# Patient Record
Sex: FEMALE | Race: WHITE | NOT HISPANIC OR LATINO | Employment: UNEMPLOYED | URBAN - METROPOLITAN AREA
[De-identification: names, ages, dates, MRNs, and addresses within clinical notes are randomized per-mention and may not be internally consistent; named-entity substitution may affect disease eponyms.]

---

## 2018-08-06 ENCOUNTER — HISTORICAL (OUTPATIENT)
Dept: ADMINISTRATIVE | Facility: HOSPITAL | Age: 33
End: 2018-08-06

## 2018-08-06 LAB
ABS NEUT (OLG): 6.28 X10(3)/MCL (ref 2.1–9.2)
BASOPHILS # BLD AUTO: 0 X10(3)/MCL (ref 0–0.2)
BASOPHILS NFR BLD AUTO: 0 %
CHLAMYDIA TRACHOMATIS CULTURE: NEGATIVE
EOSINOPHIL # BLD AUTO: 0 X10(3)/MCL (ref 0–0.9)
EOSINOPHIL NFR BLD AUTO: 0 %
ERYTHROCYTE [DISTWIDTH] IN BLOOD BY AUTOMATED COUNT: 11.9 % (ref 11.5–17)
GROUP & RH: NORMAL
HBV SURFACE AG SERPL QL IA: NEGATIVE
HCT VFR BLD AUTO: 43 % (ref 37–47)
HGB BLD-MCNC: 14.3 GM/DL (ref 12–16)
HIV 1+2 AB+HIV1 P24 AG SERPL QL IA: NEGATIVE
LYMPHOCYTES # BLD AUTO: 2.6 X10(3)/MCL (ref 0.6–4.6)
LYMPHOCYTES NFR BLD AUTO: 27 %
MCH RBC QN AUTO: 30.8 PG (ref 27–31)
MCHC RBC AUTO-ENTMCNC: 33.3 GM/DL (ref 33–36)
MCV RBC AUTO: 92.5 FL (ref 80–94)
MONOCYTES # BLD AUTO: 0.6 X10(3)/MCL (ref 0.1–1.3)
MONOCYTES NFR BLD AUTO: 6 %
NEISSERIA GONORRHOEAE (GC) CULTURE: NEGATIVE
NEUTROPHILS # BLD AUTO: 6.28 X10(3)/MCL (ref 1.4–7.9)
NEUTROPHILS NFR BLD AUTO: 66 %
PAP RECOMMENDATION EXT: NORMAL
PAP SMEAR: NORMAL
PLATELET # BLD AUTO: 280 X10(3)/MCL (ref 130–400)
PMV BLD AUTO: 9.2 FL (ref 9.4–12.4)
RBC # BLD AUTO: 4.65 X10(6)/MCL (ref 4.2–5.4)
RPR SER QL: NORMAL
WBC # SPEC AUTO: 9.6 X10(3)/MCL (ref 4.5–11.5)

## 2018-12-05 ENCOUNTER — HISTORICAL (OUTPATIENT)
Dept: ADMINISTRATIVE | Facility: HOSPITAL | Age: 33
End: 2018-12-05

## 2018-12-05 LAB
ERYTHROCYTE [DISTWIDTH] IN BLOOD BY AUTOMATED COUNT: 12.4 % (ref 11.5–17)
GLUCOSE 1H P 100 G GLC PO SERPL-MCNC: 166 MG/DL (ref 100–180)
HCT VFR BLD AUTO: 39.5 % (ref 37–47)
HGB BLD-MCNC: 13 GM/DL (ref 12–16)
MCH RBC QN AUTO: 31.7 PG (ref 27–31)
MCHC RBC AUTO-ENTMCNC: 32.9 GM/DL (ref 33–36)
MCV RBC AUTO: 96.3 FL (ref 80–94)
PLATELET # BLD AUTO: 206 X10(3)/MCL (ref 130–400)
PMV BLD AUTO: 9.5 FL (ref 9.4–12.4)
RBC # BLD AUTO: 4.1 X10(6)/MCL (ref 4.2–5.4)
WBC # SPEC AUTO: 8.7 X10(3)/MCL (ref 4.5–11.5)

## 2018-12-14 ENCOUNTER — HISTORICAL (OUTPATIENT)
Dept: ADMINISTRATIVE | Facility: HOSPITAL | Age: 33
End: 2018-12-14

## 2018-12-14 LAB
GLUCOSE 1H P 100 G GLC PO SERPL-MCNC: 203 MG/DL (ref 100–180)
GLUCOSE 2H P 100 G GLC PO SERPL-MCNC: 159 MG/DL (ref 70–140)
GLUCOSE 3H P 100 G GLC PO SERPL-MCNC: 103 MG/DL (ref 70–115)
GLUCOSE BS SERPL-MCNC: 89 MG/DL (ref 70–115)

## 2019-01-28 ENCOUNTER — HISTORICAL (OUTPATIENT)
Dept: ADMINISTRATIVE | Facility: HOSPITAL | Age: 34
End: 2019-01-28

## 2019-01-28 LAB
APPEARANCE, UA: CLEAR
BACTERIA #/AREA URNS AUTO: ABNORMAL /HPF
BILIRUB UR QL STRIP: NEGATIVE
COLOR UR: YELLOW
GLUCOSE (UA): NEGATIVE
HGB UR QL STRIP: ABNORMAL
KETONES UR QL STRIP: NEGATIVE
LEUKOCYTE ESTERASE UR QL STRIP: ABNORMAL
NITRITE UR QL STRIP.AUTO: NEGATIVE
PH UR STRIP: 6 [PH] (ref 5–9)
PROT UR QL STRIP: NEGATIVE
RBC #/AREA URNS HPF: 5 /HPF (ref 0–2)
SP GR UR STRIP: 1.01 (ref 1–1.03)
SQUAMOUS EPITHELIAL, UA: ABNORMAL
UROBILINOGEN UR STRIP-ACNC: 0.2
WBC #/AREA URNS AUTO: 5 /HPF (ref 0–3)

## 2019-01-29 ENCOUNTER — HISTORICAL (OUTPATIENT)
Dept: RADIOLOGY | Facility: HOSPITAL | Age: 34
End: 2019-01-29

## 2019-01-30 LAB — FINAL CULTURE: NO GROWTH

## 2019-02-07 ENCOUNTER — HISTORICAL (OUTPATIENT)
Dept: LAB | Facility: HOSPITAL | Age: 34
End: 2019-02-07

## 2019-04-22 ENCOUNTER — HISTORICAL (OUTPATIENT)
Dept: ADMINISTRATIVE | Facility: HOSPITAL | Age: 34
End: 2019-04-22

## 2022-04-11 ENCOUNTER — HISTORICAL (OUTPATIENT)
Dept: ADMINISTRATIVE | Facility: HOSPITAL | Age: 37
End: 2022-04-11

## 2022-04-27 VITALS
SYSTOLIC BLOOD PRESSURE: 120 MMHG | BODY MASS INDEX: 42.68 KG/M2 | DIASTOLIC BLOOD PRESSURE: 78 MMHG | HEIGHT: 64 IN | WEIGHT: 250 LBS

## 2022-09-21 ENCOUNTER — LAB VISIT (OUTPATIENT)
Dept: LAB | Facility: HOSPITAL | Age: 37
End: 2022-09-21
Attending: INTERNAL MEDICINE
Payer: COMMERCIAL

## 2022-09-21 DIAGNOSIS — K21.9 GASTROESOPHAGEAL REFLUX DISEASE, UNSPECIFIED WHETHER ESOPHAGITIS PRESENT: ICD-10-CM

## 2022-09-21 DIAGNOSIS — K76.0 FATTY METAMORPHOSIS OF LIVER: ICD-10-CM

## 2022-09-21 DIAGNOSIS — K82.8 ADHESION OF GALLBLADDER: ICD-10-CM

## 2022-09-21 DIAGNOSIS — R10.13 ABDOMINAL PAIN, EPIGASTRIC: Primary | ICD-10-CM

## 2022-09-21 DIAGNOSIS — F41.1 GENERALIZED ANXIETY DISORDER: ICD-10-CM

## 2022-09-21 DIAGNOSIS — E28.2 POLYCYSTIC OVARIES: ICD-10-CM

## 2022-09-21 DIAGNOSIS — E66.9 OBESITY, UNSPECIFIED CLASSIFICATION, UNSPECIFIED OBESITY TYPE, UNSPECIFIED WHETHER SERIOUS COMORBIDITY PRESENT: ICD-10-CM

## 2022-09-21 DIAGNOSIS — N20.0 URIC ACID NEPHROLITHIASIS: ICD-10-CM

## 2022-09-21 DIAGNOSIS — R19.4 FREQUENT BOWEL MOVEMENTS: ICD-10-CM

## 2022-09-21 LAB
ALBUMIN SERPL-MCNC: 4.3 GM/DL (ref 3.5–5)
ALBUMIN/GLOB SERPL: 1.5 RATIO (ref 1.1–2)
ALP SERPL-CCNC: 79 UNIT/L (ref 40–150)
ALT SERPL-CCNC: 35 UNIT/L (ref 0–55)
AMYLASE SERPL-CCNC: 47 UNIT/L (ref 25–125)
AST SERPL-CCNC: 19 UNIT/L (ref 5–34)
BASOPHILS # BLD AUTO: 0.02 X10(3)/MCL (ref 0–0.2)
BASOPHILS NFR BLD AUTO: 0.2 %
BILIRUBIN DIRECT+TOT PNL SERPL-MCNC: 0.7 MG/DL
BUN SERPL-MCNC: 8.3 MG/DL (ref 7–18.7)
CALCIUM SERPL-MCNC: 9.6 MG/DL (ref 8.4–10.2)
CHLORIDE SERPL-SCNC: 104 MMOL/L (ref 98–107)
CO2 SERPL-SCNC: 27 MMOL/L (ref 22–29)
CREAT SERPL-MCNC: 0.72 MG/DL (ref 0.55–1.02)
EOSINOPHIL # BLD AUTO: 0.34 X10(3)/MCL (ref 0–0.9)
EOSINOPHIL NFR BLD AUTO: 3.7 %
ERYTHROCYTE [DISTWIDTH] IN BLOOD BY AUTOMATED COUNT: 12.5 % (ref 11.5–17)
GFR SERPLBLD CREATININE-BSD FMLA CKD-EPI: >60 MLS/MIN/1.73/M2
GLOBULIN SER-MCNC: 2.9 GM/DL (ref 2.4–3.5)
GLUCOSE SERPL-MCNC: 82 MG/DL (ref 74–100)
HCT VFR BLD AUTO: 38.1 % (ref 37–47)
HGB BLD-MCNC: 12.4 GM/DL (ref 12–16)
IGA SERPL-MCNC: 114 MG/DL (ref 65–421)
IMM GRANULOCYTES # BLD AUTO: 0.05 X10(3)/MCL (ref 0–0.04)
IMM GRANULOCYTES NFR BLD AUTO: 0.5 %
LIPASE SERPL-CCNC: 13 U/L
LYMPHOCYTES # BLD AUTO: 2.99 X10(3)/MCL (ref 0.6–4.6)
LYMPHOCYTES NFR BLD AUTO: 32.8 %
MCH RBC QN AUTO: 29.4 PG (ref 27–31)
MCHC RBC AUTO-ENTMCNC: 32.5 MG/DL (ref 33–36)
MCV RBC AUTO: 90.3 FL (ref 80–94)
MONOCYTES # BLD AUTO: 0.61 X10(3)/MCL (ref 0.1–1.3)
MONOCYTES NFR BLD AUTO: 6.7 %
NEUTROPHILS # BLD AUTO: 5.1 X10(3)/MCL (ref 2.1–9.2)
NEUTROPHILS NFR BLD AUTO: 56.1 %
NRBC BLD AUTO-RTO: 0 %
PLATELET # BLD AUTO: 223 X10(3)/MCL (ref 130–400)
PMV BLD AUTO: 10.1 FL (ref 7.4–10.4)
POTASSIUM SERPL-SCNC: 3.7 MMOL/L (ref 3.5–5.1)
PROT SERPL-MCNC: 7.2 GM/DL (ref 6.4–8.3)
RBC # BLD AUTO: 4.22 X10(6)/MCL (ref 4.2–5.4)
SODIUM SERPL-SCNC: 139 MMOL/L (ref 136–145)
TSH SERPL-ACNC: 1.74 UIU/ML (ref 0.35–4.94)
WBC # SPEC AUTO: 9.1 X10(3)/MCL (ref 4.5–11.5)

## 2022-09-21 PROCEDURE — 36415 COLL VENOUS BLD VENIPUNCTURE: CPT

## 2022-09-21 PROCEDURE — 82150 ASSAY OF AMYLASE: CPT

## 2022-09-21 PROCEDURE — 85025 COMPLETE CBC W/AUTO DIFF WBC: CPT

## 2022-09-21 PROCEDURE — 80053 COMPREHEN METABOLIC PANEL: CPT

## 2022-09-21 PROCEDURE — 84443 ASSAY THYROID STIM HORMONE: CPT

## 2022-09-21 PROCEDURE — 83690 ASSAY OF LIPASE: CPT

## 2022-09-21 PROCEDURE — 82784 ASSAY IGA/IGD/IGG/IGM EACH: CPT

## 2022-09-21 PROCEDURE — 83516 IMMUNOASSAY NONANTIBODY: CPT

## 2022-09-26 LAB — TISSUE TRANSGLUTAMINASE IGA ANTIBODY (OHS) CATEGORY: NEGATIVE

## 2022-10-11 ENCOUNTER — PATIENT MESSAGE (OUTPATIENT)
Dept: ADMINISTRATIVE | Facility: OTHER | Age: 37
End: 2022-10-11
Payer: COMMERCIAL

## 2022-10-12 ENCOUNTER — PATIENT MESSAGE (OUTPATIENT)
Dept: ADMINISTRATIVE | Facility: OTHER | Age: 37
End: 2022-10-12
Payer: COMMERCIAL

## 2022-10-13 ENCOUNTER — PATIENT MESSAGE (OUTPATIENT)
Dept: ADMINISTRATIVE | Facility: OTHER | Age: 37
End: 2022-10-13
Payer: COMMERCIAL

## 2022-10-13 ENCOUNTER — LAB VISIT (OUTPATIENT)
Dept: LAB | Facility: HOSPITAL | Age: 37
End: 2022-10-13
Attending: SURGERY
Payer: COMMERCIAL

## 2022-10-13 DIAGNOSIS — Z01.818 PREOPERATIVE EXAMINATION, UNSPECIFIED: Primary | ICD-10-CM

## 2022-10-13 LAB — SARS-COV-2 RDRP RESP QL NAA+PROBE: NEGATIVE

## 2022-10-13 PROCEDURE — 87635 SARS-COV-2 COVID-19 AMP PRB: CPT

## 2022-10-14 PROBLEM — K81.1 CHRONIC CHOLECYSTITIS: Status: ACTIVE | Noted: 2022-10-14

## 2023-01-23 LAB
LEFT EYE DM RETINOPATHY: NEGATIVE
RIGHT EYE DM RETINOPATHY: NEGATIVE

## 2023-01-25 ENCOUNTER — DOCUMENTATION ONLY (OUTPATIENT)
Dept: ADMINISTRATIVE | Facility: HOSPITAL | Age: 38
End: 2023-01-25
Payer: COMMERCIAL

## 2023-02-09 ENCOUNTER — TELEPHONE (OUTPATIENT)
Dept: FAMILY MEDICINE | Facility: CLINIC | Age: 38
End: 2023-02-09

## 2023-02-09 ENCOUNTER — OFFICE VISIT (OUTPATIENT)
Dept: FAMILY MEDICINE | Facility: CLINIC | Age: 38
End: 2023-02-09
Payer: COMMERCIAL

## 2023-02-09 VITALS
WEIGHT: 181 LBS | TEMPERATURE: 97 F | HEART RATE: 104 BPM | SYSTOLIC BLOOD PRESSURE: 118 MMHG | DIASTOLIC BLOOD PRESSURE: 68 MMHG | BODY MASS INDEX: 30.9 KG/M2 | RESPIRATION RATE: 18 BRPM | HEIGHT: 64 IN | OXYGEN SATURATION: 97 %

## 2023-02-09 DIAGNOSIS — E11.9 TYPE 2 DIABETES MELLITUS WITHOUT COMPLICATION, WITHOUT LONG-TERM CURRENT USE OF INSULIN: Primary | ICD-10-CM

## 2023-02-09 DIAGNOSIS — N13.30 HYDRONEPHROSIS, UNSPECIFIED HYDRONEPHROSIS TYPE: Primary | ICD-10-CM

## 2023-02-09 DIAGNOSIS — F41.9 ANXIETY: ICD-10-CM

## 2023-02-09 DIAGNOSIS — E78.2 MIXED HYPERLIPIDEMIA: ICD-10-CM

## 2023-02-09 DIAGNOSIS — J01.00 ACUTE NON-RECURRENT MAXILLARY SINUSITIS: ICD-10-CM

## 2023-02-09 DIAGNOSIS — F34.1 PERSISTENT DEPRESSIVE DISORDER: ICD-10-CM

## 2023-02-09 PROBLEM — E66.01 SEVERE OBESITY (BMI 35.0-39.9) WITH COMORBIDITY: Status: ACTIVE | Noted: 2023-02-09

## 2023-02-09 PROCEDURE — 3008F BODY MASS INDEX DOCD: CPT | Mod: CPTII,,, | Performed by: NURSE PRACTITIONER

## 2023-02-09 PROCEDURE — 1159F PR MEDICATION LIST DOCUMENTED IN MEDICAL RECORD: ICD-10-PCS | Mod: CPTII,,, | Performed by: NURSE PRACTITIONER

## 2023-02-09 PROCEDURE — 99213 OFFICE O/P EST LOW 20 MIN: CPT | Mod: ,,, | Performed by: NURSE PRACTITIONER

## 2023-02-09 PROCEDURE — 3008F PR BODY MASS INDEX (BMI) DOCUMENTED: ICD-10-PCS | Mod: CPTII,,, | Performed by: NURSE PRACTITIONER

## 2023-02-09 PROCEDURE — 3074F SYST BP LT 130 MM HG: CPT | Mod: CPTII,,, | Performed by: NURSE PRACTITIONER

## 2023-02-09 PROCEDURE — 3078F DIAST BP <80 MM HG: CPT | Mod: CPTII,,, | Performed by: NURSE PRACTITIONER

## 2023-02-09 PROCEDURE — 1159F MED LIST DOCD IN RCRD: CPT | Mod: CPTII,,, | Performed by: NURSE PRACTITIONER

## 2023-02-09 PROCEDURE — 3078F PR MOST RECENT DIASTOLIC BLOOD PRESSURE < 80 MM HG: ICD-10-PCS | Mod: CPTII,,, | Performed by: NURSE PRACTITIONER

## 2023-02-09 PROCEDURE — 99213 PR OFFICE/OUTPT VISIT, EST, LEVL III, 20-29 MIN: ICD-10-PCS | Mod: ,,, | Performed by: NURSE PRACTITIONER

## 2023-02-09 PROCEDURE — 3074F PR MOST RECENT SYSTOLIC BLOOD PRESSURE < 130 MM HG: ICD-10-PCS | Mod: CPTII,,, | Performed by: NURSE PRACTITIONER

## 2023-02-09 RX ORDER — AMOXICILLIN 875 MG/1
875 TABLET, FILM COATED ORAL EVERY 12 HOURS
Qty: 20 TABLET | Refills: 0 | Status: SHIPPED | OUTPATIENT
Start: 2023-02-09 | End: 2023-02-19

## 2023-02-09 NOTE — PROGRESS NOTES
SUBJECTIVE:  Nuris Montgomery is a 37 y.o. female here for Nasal Congestion, Fever (High temp 100), Sore Throat, Otalgia (Bilateral intermittent), Headache, and Medication Reaction (Abilify causing irritability)      Fever   Associated symptoms include ear pain, headaches and a sore throat. Pertinent negatives include no chest pain, diarrhea, urinary pain, vomiting or wheezing.   Sore Throat   Associated symptoms include ear pain, headaches, neck pain and trouble swallowing. Pertinent negatives include no diarrhea or vomiting.   Otalgia   Associated symptoms include headaches, neck pain, rhinorrhea and a sore throat. Pertinent negatives include no diarrhea, hearing loss or vomiting.   Headache   Associated symptoms include ear pain, a fever, neck pain, rhinorrhea and a sore throat. Pertinent negatives include no hearing loss, vomiting or weakness.   Medication Reaction  Associated symptoms include a fever, headaches, neck pain and a sore throat. Pertinent negatives include no arthralgias, chest pain, joint swelling, vomiting or weakness.   Presents to the clinic with c/o sinus congestion, sore that and low grade fever for over a week.  Denies n/v/d. Tiny   Nuris's allergies, medications, history, and problem list were updated as appropriate.    Review of Systems   Constitutional:  Positive for fever. Negative for activity change and unexpected weight change.   HENT:  Positive for ear pain, rhinorrhea, sore throat and trouble swallowing. Negative for hearing loss.    Eyes:  Negative for discharge and visual disturbance.   Respiratory:  Negative for chest tightness and wheezing.    Cardiovascular:  Negative for chest pain and palpitations.   Gastrointestinal:  Negative for blood in stool, constipation, diarrhea and vomiting.   Endocrine: Negative for polydipsia and polyuria.   Genitourinary:  Negative for difficulty urinating, dysuria, hematuria and menstrual problem.   Musculoskeletal:  Positive for neck pain. Negative  "for arthralgias and joint swelling.   Neurological:  Positive for headaches. Negative for weakness.   Psychiatric/Behavioral:  Negative for confusion and dysphoric mood. The patient is nervous/anxious.     A comprehensive review of symptoms was completed and negative except as noted above.    No results found for this or any previous visit (from the past 504 hour(s)).    OBJECTIVE:  Vital signs  Vitals:    02/09/23 1500   BP: 118/68   BP Location: Left arm   Patient Position: Sitting   Pulse: 104   Resp: 18   Temp: 97.4 °F (36.3 °C)   TempSrc: Temporal   SpO2: 97%   Weight: 82.1 kg (181 lb)   Height: 5' 4" (1.626 m)        Physical Exam  Constitutional:       Appearance: Normal appearance.   HENT:      Head: Normocephalic and atraumatic.      Right Ear: Tympanic membrane and external ear normal.      Left Ear: Tympanic membrane and external ear normal.      Nose: Congestion present.      Mouth/Throat:      Mouth: Mucous membranes are moist.      Pharynx: Oropharynx is clear. Posterior oropharyngeal erythema present.   Eyes:      Extraocular Movements: Extraocular movements intact.      Conjunctiva/sclera: Conjunctivae normal.      Pupils: Pupils are equal, round, and reactive to light.   Cardiovascular:      Rate and Rhythm: Normal rate and regular rhythm.   Pulmonary:      Effort: Pulmonary effort is normal.      Breath sounds: Normal breath sounds.   Abdominal:      General: Bowel sounds are normal.      Palpations: Abdomen is soft.   Musculoskeletal:         General: Normal range of motion.      Cervical back: Normal range of motion and neck supple.   Skin:     General: Skin is warm.   Neurological:      Mental Status: She is alert.   Psychiatric:         Mood and Affect: Mood is anxious.         Behavior: Behavior is agitated.         Judgment: Judgment normal.        ASSESSMENT/PLAN:  1. Type 2 diabetes mellitus without complication, without long-term current use of insulin  Comments:  increase Mounjaro to 15 " mg    2. Acute non-recurrent maxillary sinusitis  -     amoxicillin (AMOXIL) 875 MG tablet; Take 1 tablet (875 mg total) by mouth every 12 (twelve) hours. for 10 days  Dispense: 20 tablet; Refill: 0    3. Anxiety  Comments:  stopped Abilify because she said it made her feel more anxious.    4. Persistent depressive disorder  Comments:  continue Lexapro    5. Mixed hyperlipidemia  Comments:  continue rosuvastatin        Follow Up:  No follow-ups on file.

## 2023-03-16 ENCOUNTER — OFFICE VISIT (OUTPATIENT)
Dept: FAMILY MEDICINE | Facility: CLINIC | Age: 38
End: 2023-03-16
Payer: COMMERCIAL

## 2023-03-16 VITALS
WEIGHT: 170 LBS | BODY MASS INDEX: 29.02 KG/M2 | OXYGEN SATURATION: 100 % | HEIGHT: 64 IN | SYSTOLIC BLOOD PRESSURE: 128 MMHG | DIASTOLIC BLOOD PRESSURE: 70 MMHG | TEMPERATURE: 99 F | RESPIRATION RATE: 20 BRPM | HEART RATE: 107 BPM

## 2023-03-16 DIAGNOSIS — F41.9 ANXIETY AND DEPRESSION: ICD-10-CM

## 2023-03-16 DIAGNOSIS — E11.9 TYPE 2 DIABETES MELLITUS WITHOUT COMPLICATION, WITHOUT LONG-TERM CURRENT USE OF INSULIN: Primary | ICD-10-CM

## 2023-03-16 DIAGNOSIS — F41.1 GAD (GENERALIZED ANXIETY DISORDER): ICD-10-CM

## 2023-03-16 DIAGNOSIS — F32.A ANXIETY AND DEPRESSION: ICD-10-CM

## 2023-03-16 PROCEDURE — 3078F PR MOST RECENT DIASTOLIC BLOOD PRESSURE < 80 MM HG: ICD-10-PCS | Mod: CPTII,,, | Performed by: NURSE PRACTITIONER

## 2023-03-16 PROCEDURE — 1159F MED LIST DOCD IN RCRD: CPT | Mod: CPTII,,, | Performed by: NURSE PRACTITIONER

## 2023-03-16 PROCEDURE — 3074F PR MOST RECENT SYSTOLIC BLOOD PRESSURE < 130 MM HG: ICD-10-PCS | Mod: CPTII,,, | Performed by: NURSE PRACTITIONER

## 2023-03-16 PROCEDURE — 99213 PR OFFICE/OUTPT VISIT, EST, LEVL III, 20-29 MIN: ICD-10-PCS | Mod: ,,, | Performed by: NURSE PRACTITIONER

## 2023-03-16 PROCEDURE — 99213 OFFICE O/P EST LOW 20 MIN: CPT | Mod: ,,, | Performed by: NURSE PRACTITIONER

## 2023-03-16 PROCEDURE — 3008F BODY MASS INDEX DOCD: CPT | Mod: CPTII,,, | Performed by: NURSE PRACTITIONER

## 2023-03-16 PROCEDURE — 1159F PR MEDICATION LIST DOCUMENTED IN MEDICAL RECORD: ICD-10-PCS | Mod: CPTII,,, | Performed by: NURSE PRACTITIONER

## 2023-03-16 PROCEDURE — 3008F PR BODY MASS INDEX (BMI) DOCUMENTED: ICD-10-PCS | Mod: CPTII,,, | Performed by: NURSE PRACTITIONER

## 2023-03-16 PROCEDURE — 3078F DIAST BP <80 MM HG: CPT | Mod: CPTII,,, | Performed by: NURSE PRACTITIONER

## 2023-03-16 PROCEDURE — 3074F SYST BP LT 130 MM HG: CPT | Mod: CPTII,,, | Performed by: NURSE PRACTITIONER

## 2023-03-16 RX ORDER — ALPRAZOLAM 0.25 MG/1
0.25 TABLET ORAL 3 TIMES DAILY PRN
Qty: 75 TABLET | Refills: 1 | Status: SHIPPED | OUTPATIENT
Start: 2023-03-16 | End: 2024-03-13

## 2023-03-16 RX ORDER — TIRZEPATIDE 15 MG/.5ML
15 INJECTION, SOLUTION SUBCUTANEOUS
Qty: 4 PEN | Refills: 2 | Status: SHIPPED | OUTPATIENT
Start: 2023-03-16 | End: 2023-04-15

## 2023-03-16 RX ORDER — TIRZEPATIDE 15 MG/.5ML
INJECTION, SOLUTION SUBCUTANEOUS
COMMUNITY
Start: 2023-02-09 | End: 2023-03-16 | Stop reason: SDUPTHER

## 2023-03-16 NOTE — PROGRESS NOTES
"SUBJECTIVE:  Nuris Montgomery is a 37 y.o. female here for Follow-up (1 mth f/u on medications. Working well. Needing refills. )      HPI  Presents to the clinic in follow-up.  Farooq continues to work well.  She is very anxious as she was called at 2 am being told her  broke is back.  He works [employer] and fell in an 8 ft hole.  He is awaiting transfer to a larger facility.  She is scheduled to have a hysterectomy in the next month.      Nuris's allergies, medications, history, and problem list were updated as appropriate.    Review of Systems   Constitutional:  Negative for activity change and unexpected weight change.   HENT:  Negative for hearing loss, rhinorrhea and trouble swallowing.    Eyes:  Negative for discharge and visual disturbance.   Respiratory:  Negative for chest tightness and wheezing.    Cardiovascular:  Negative for chest pain and palpitations.   Gastrointestinal:  Negative for blood in stool, constipation, diarrhea and vomiting.   Endocrine: Negative for polydipsia and polyuria.   Genitourinary:  Negative for difficulty urinating, dysuria, hematuria and menstrual problem.   Musculoskeletal:  Negative for arthralgias, joint swelling and neck pain.   Neurological:  Negative for weakness and headaches.   Psychiatric/Behavioral:  Negative for confusion and dysphoric mood.     A comprehensive review of symptoms was completed and negative except as noted above.    No results found for this or any previous visit (from the past 504 hour(s)).    OBJECTIVE:  Vital signs  Vitals:    03/16/23 0940   BP: 128/70   BP Location: Right arm   Patient Position: Sitting   Pulse: 107   Resp: 20   Temp: 98.6 °F (37 °C)   SpO2: 100%   Weight: 77.1 kg (170 lb)   Height: 5' 4" (1.626 m)        Physical Exam  Constitutional:       Appearance: Normal appearance.   HENT:      Head: Normocephalic and atraumatic.      Nose: Nose normal.      Mouth/Throat:      Mouth: Mucous membranes are moist.      Pharynx: " Oropharynx is clear.   Eyes:      Extraocular Movements: Extraocular movements intact.      Conjunctiva/sclera: Conjunctivae normal.      Pupils: Pupils are equal, round, and reactive to light.   Cardiovascular:      Rate and Rhythm: Normal rate and regular rhythm.   Pulmonary:      Effort: Pulmonary effort is normal.      Breath sounds: Normal breath sounds.   Abdominal:      General: Bowel sounds are normal.      Palpations: Abdomen is soft.   Musculoskeletal:         General: Normal range of motion.      Cervical back: Normal range of motion and neck supple.   Skin:     General: Skin is warm.   Neurological:      Mental Status: She is alert.   Psychiatric:         Mood and Affect: Mood is anxious. Affect is tearful.         Behavior: Behavior normal.         Judgment: Judgment normal.        ASSESSMENT/PLAN:  1. Type 2 diabetes mellitus without complication, without long-term current use of insulin  -     MOUNJARO 15 mg/0.5 mL PnIj; Inject 15 mg into the skin every 7 days.  Dispense: 4 pen; Refill: 2    2. ZOEY (generalized anxiety disorder)  -     ALPRAZolam (XANAX) 0.25 MG tablet; Take 1 tablet (0.25 mg total) by mouth 3 (three) times daily as needed for Anxiety. as needed for anxiety.  Dispense: 75 tablet; Refill: 1    3. Anxiety and depression  -     EScitalopram oxalate (LEXAPRO) 20 MG tablet; Take 1 tablet (20 mg total) by mouth once daily.  Dispense: 30 tablet; Refill: 2        Follow Up:  Follow up in about 3 months (around 6/16/2023).

## 2023-03-20 RX ORDER — ESCITALOPRAM OXALATE 20 MG/1
20 TABLET ORAL DAILY
Qty: 30 TABLET | Refills: 2 | Status: SHIPPED | OUTPATIENT
Start: 2023-03-20 | End: 2023-09-19

## 2023-03-30 ENCOUNTER — DOCUMENTATION ONLY (OUTPATIENT)
Dept: ADMINISTRATIVE | Facility: HOSPITAL | Age: 38
End: 2023-03-30
Payer: COMMERCIAL

## 2023-05-10 ENCOUNTER — TELEPHONE (OUTPATIENT)
Dept: FAMILY MEDICINE | Facility: CLINIC | Age: 38
End: 2023-05-10
Payer: COMMERCIAL

## 2023-05-10 DIAGNOSIS — E11.9 TYPE 2 DIABETES MELLITUS WITHOUT COMPLICATION, WITHOUT LONG-TERM CURRENT USE OF INSULIN: ICD-10-CM

## 2023-05-10 RX ORDER — METFORMIN HYDROCHLORIDE 500 MG/1
TABLET, EXTENDED RELEASE ORAL
Qty: 60 TABLET | Refills: 2 | Status: SHIPPED | OUTPATIENT
Start: 2023-05-10 | End: 2023-10-18 | Stop reason: SDUPTHER

## 2023-05-10 NOTE — TELEPHONE ENCOUNTER
----- Message from Magda Norman sent at 5/10/2023  9:06 AM CDT -----  Patient needs refill on Metformin 500 ER called in to [facility] in [place]

## 2023-05-16 ENCOUNTER — TELEPHONE (OUTPATIENT)
Dept: FAMILY MEDICINE | Facility: CLINIC | Age: 38
End: 2023-05-16
Payer: COMMERCIAL

## 2023-05-16 DIAGNOSIS — F41.9 ANXIETY AND DEPRESSION: Primary | ICD-10-CM

## 2023-05-16 DIAGNOSIS — F32.A ANXIETY AND DEPRESSION: Primary | ICD-10-CM

## 2023-05-16 RX ORDER — BREXPIPRAZOLE 1 MG/1
1 TABLET ORAL DAILY
Qty: 30 TABLET | Refills: 0 | Status: SHIPPED | OUTPATIENT
Start: 2023-05-16 | End: 2023-06-19

## 2023-05-16 NOTE — TELEPHONE ENCOUNTER
----- Message from Kaylene Villatoro sent at 5/16/2023  9:52 AM CDT -----  Regarding: Send script to pharm      She was put on a 14 day starter pack of Rexulti and was told to call here to let Farideh know how she is doing on the new med.  She loves it and wants to continue.  Please call out a script for Rexulti          [facility] in [place]

## 2023-05-18 DIAGNOSIS — F41.1 GAD (GENERALIZED ANXIETY DISORDER): Primary | ICD-10-CM

## 2023-05-18 RX ORDER — BREXPIPRAZOLE 1 MG/1
1 TABLET ORAL DAILY
Qty: 14 TABLET | Refills: 0 | COMMUNITY
Start: 2023-05-18 | End: 2023-06-19

## 2023-06-19 DIAGNOSIS — F32.A ANXIETY AND DEPRESSION: ICD-10-CM

## 2023-06-19 DIAGNOSIS — F41.9 ANXIETY AND DEPRESSION: ICD-10-CM

## 2023-06-19 RX ORDER — BREXPIPRAZOLE 1 MG/1
1 TABLET ORAL DAILY
Qty: 30 TABLET | Refills: 2 | Status: SHIPPED | OUTPATIENT
Start: 2023-06-19 | End: 2023-07-31

## 2023-07-19 ENCOUNTER — OFFICE VISIT (OUTPATIENT)
Dept: FAMILY MEDICINE | Facility: CLINIC | Age: 38
End: 2023-07-19
Payer: COMMERCIAL

## 2023-07-19 VITALS
SYSTOLIC BLOOD PRESSURE: 124 MMHG | HEART RATE: 90 BPM | DIASTOLIC BLOOD PRESSURE: 70 MMHG | TEMPERATURE: 97 F | WEIGHT: 157 LBS | HEIGHT: 64 IN | BODY MASS INDEX: 26.8 KG/M2 | RESPIRATION RATE: 18 BRPM | OXYGEN SATURATION: 98 %

## 2023-07-19 DIAGNOSIS — E11.9 ENCOUNTER FOR DIABETIC FOOT EXAM: ICD-10-CM

## 2023-07-19 DIAGNOSIS — F32.A ANXIETY AND DEPRESSION: ICD-10-CM

## 2023-07-19 DIAGNOSIS — E11.9 TYPE 2 DIABETES MELLITUS WITHOUT COMPLICATION, WITHOUT LONG-TERM CURRENT USE OF INSULIN: Primary | ICD-10-CM

## 2023-07-19 DIAGNOSIS — F41.9 ANXIETY AND DEPRESSION: ICD-10-CM

## 2023-07-19 PROCEDURE — 3008F PR BODY MASS INDEX (BMI) DOCUMENTED: ICD-10-PCS | Mod: CPTII,,, | Performed by: NURSE PRACTITIONER

## 2023-07-19 PROCEDURE — 1159F PR MEDICATION LIST DOCUMENTED IN MEDICAL RECORD: ICD-10-PCS | Mod: CPTII,,, | Performed by: NURSE PRACTITIONER

## 2023-07-19 PROCEDURE — 3078F DIAST BP <80 MM HG: CPT | Mod: CPTII,,, | Performed by: NURSE PRACTITIONER

## 2023-07-19 PROCEDURE — 99213 OFFICE O/P EST LOW 20 MIN: CPT | Mod: ,,, | Performed by: NURSE PRACTITIONER

## 2023-07-19 PROCEDURE — 3008F BODY MASS INDEX DOCD: CPT | Mod: CPTII,,, | Performed by: NURSE PRACTITIONER

## 2023-07-19 PROCEDURE — 3074F SYST BP LT 130 MM HG: CPT | Mod: CPTII,,, | Performed by: NURSE PRACTITIONER

## 2023-07-19 PROCEDURE — 3074F PR MOST RECENT SYSTOLIC BLOOD PRESSURE < 130 MM HG: ICD-10-PCS | Mod: CPTII,,, | Performed by: NURSE PRACTITIONER

## 2023-07-19 PROCEDURE — 3078F PR MOST RECENT DIASTOLIC BLOOD PRESSURE < 80 MM HG: ICD-10-PCS | Mod: CPTII,,, | Performed by: NURSE PRACTITIONER

## 2023-07-19 PROCEDURE — 99213 PR OFFICE/OUTPT VISIT, EST, LEVL III, 20-29 MIN: ICD-10-PCS | Mod: ,,, | Performed by: NURSE PRACTITIONER

## 2023-07-19 PROCEDURE — 1159F MED LIST DOCD IN RCRD: CPT | Mod: CPTII,,, | Performed by: NURSE PRACTITIONER

## 2023-07-19 RX ORDER — BREXPIPRAZOLE 2 MG/1
2 TABLET ORAL DAILY
Qty: 14 TABLET | Refills: 0 | COMMUNITY
Start: 2023-07-19 | End: 2023-07-31 | Stop reason: SDUPTHER

## 2023-07-19 RX ORDER — TIRZEPATIDE 15 MG/.5ML
INJECTION, SOLUTION SUBCUTANEOUS
Qty: 4 PEN | Refills: 2 | Status: SHIPPED | OUTPATIENT
Start: 2023-07-19 | End: 2023-09-25

## 2023-07-19 RX ORDER — TRANEXAMIC ACID 650 MG/1
650 TABLET ORAL 3 TIMES DAILY
COMMUNITY
Start: 2023-06-12 | End: 2024-02-22

## 2023-07-19 RX ORDER — TIRZEPATIDE 15 MG/.5ML
INJECTION, SOLUTION SUBCUTANEOUS
COMMUNITY
Start: 2023-05-09 | End: 2023-07-19 | Stop reason: SDUPTHER

## 2023-07-19 RX ORDER — DICYCLOMINE HYDROCHLORIDE 20 MG/1
20 TABLET ORAL 4 TIMES DAILY PRN
COMMUNITY
Start: 2022-10-06 | End: 2024-02-22

## 2023-07-19 NOTE — PROGRESS NOTES
"SUBJECTIVE:  Nuris Montgomery is a 38 y.o. female here for 3 month f/u and Medication Refill      Medication Refill  Pertinent negatives include no arthralgias, chest pain, headaches, joint swelling, neck pain, vomiting or weakness.   Presents to the clinic in follow-up.  Reports she has been very stressed lately with her  and kids.  She would like to go up on the Rexulti.    Her blood sugars have been stable.  She has not been binge eating.    Nuris's allergies, medications, history, and problem list were updated as appropriate.    Review of Systems   Constitutional:  Negative for activity change and unexpected weight change.   HENT:  Negative for hearing loss, rhinorrhea and trouble swallowing.    Eyes:  Negative for discharge and visual disturbance.   Respiratory:  Negative for chest tightness and wheezing.    Cardiovascular:  Negative for chest pain and palpitations.   Gastrointestinal:  Negative for blood in stool, constipation, diarrhea and vomiting.   Endocrine: Negative for polydipsia and polyuria.   Genitourinary:  Negative for difficulty urinating, dysuria, hematuria and menstrual problem.   Musculoskeletal:  Negative for arthralgias, joint swelling and neck pain.   Neurological:  Negative for weakness and headaches.   Psychiatric/Behavioral:  Negative for confusion and dysphoric mood. The patient is nervous/anxious.     A comprehensive review of symptoms was completed and negative except as noted above.    No results found for this or any previous visit (from the past 504 hour(s)).    OBJECTIVE:  Vital signs  Vitals:    07/19/23 0915   BP: 124/70   BP Location: Right arm   Patient Position: Sitting   Pulse: 90   Resp: 18   Temp: 97.2 °F (36.2 °C)   TempSrc: Temporal   SpO2: 98%   Weight: 71.2 kg (157 lb)   Height: 5' 4" (1.626 m)        Physical Exam  Constitutional:       Appearance: Normal appearance.   HENT:      Head: Normocephalic and atraumatic.      Nose: Nose normal.      Mouth/Throat:      " Mouth: Mucous membranes are moist.      Pharynx: Oropharynx is clear.   Eyes:      Conjunctiva/sclera: Conjunctivae normal.   Cardiovascular:      Rate and Rhythm: Normal rate and regular rhythm.      Pulses:           Dorsalis pedis pulses are 2+ on the right side and 2+ on the left side.        Posterior tibial pulses are 2+ on the right side and 2+ on the left side.   Pulmonary:      Effort: Pulmonary effort is normal.      Breath sounds: Normal breath sounds.   Abdominal:      General: Bowel sounds are normal.      Palpations: Abdomen is soft.   Musculoskeletal:         General: Normal range of motion.      Cervical back: Normal range of motion and neck supple.   Feet:      Right foot:      Protective Sensation: 5 sites tested.  5 sites sensed.      Skin integrity: Skin integrity normal.      Toenail Condition: Right toenails are normal.      Left foot:      Protective Sensation: 5 sites tested.  5 sites sensed.      Skin integrity: Skin integrity normal.      Toenail Condition: Left toenails are normal.   Skin:     General: Skin is warm.      Capillary Refill: Capillary refill takes less than 2 seconds.   Neurological:      Mental Status: She is alert.   Psychiatric:         Mood and Affect: Mood is anxious and depressed.         Behavior: Behavior normal.         Judgment: Judgment normal.        ASSESSMENT/PLAN:  1. Type 2 diabetes mellitus without complication, without long-term current use of insulin  -     tirzepatide (MOUNJARO) 15 mg/0.5 mL PnIj; SMARTSIG:15 Milligram(s) SUB-Q Once a Week  Dispense: 4 pen ; Refill: 2    2. Anxiety and depression  -     brexpiprazole (REXULTI) 2 mg Tab; Take 2 mg by mouth Daily.  Dispense: 14 tablet; Refill: 0    3. Encounter for diabetic foot exam  Comments:  exam normal        Follow Up:  Follow up in about 1 month (around 8/19/2023).

## 2023-07-31 ENCOUNTER — TELEPHONE (OUTPATIENT)
Dept: FAMILY MEDICINE | Facility: CLINIC | Age: 38
End: 2023-07-31
Payer: COMMERCIAL

## 2023-07-31 DIAGNOSIS — F32.A ANXIETY AND DEPRESSION: ICD-10-CM

## 2023-07-31 DIAGNOSIS — F41.9 ANXIETY AND DEPRESSION: ICD-10-CM

## 2023-07-31 DIAGNOSIS — E11.9 TYPE 2 DIABETES MELLITUS WITHOUT COMPLICATION, WITHOUT LONG-TERM CURRENT USE OF INSULIN: Primary | ICD-10-CM

## 2023-07-31 RX ORDER — BREXPIPRAZOLE 2 MG/1
2 TABLET ORAL DAILY
Qty: 30 TABLET | Refills: 2 | COMMUNITY
Start: 2023-07-31 | End: 2023-07-31 | Stop reason: SDUPTHER

## 2023-07-31 RX ORDER — BREXPIPRAZOLE 2 MG/1
2 TABLET ORAL DAILY
Qty: 30 TABLET | Refills: 2 | Status: SHIPPED | OUTPATIENT
Start: 2023-07-31 | End: 2023-08-17

## 2023-07-31 NOTE — TELEPHONE ENCOUNTER
----- Message from Kaylene Villatoro sent at 7/31/2023 10:14 AM CDT -----  Regarding: Med fill and med question      Farideh put her on Rexulti 3mg and she is not having any issues with it and would like a script to be called out for that medication     Call [facility] out to [facility] in [place]        She still does not have Munjaro 15 available to her.  Farideh told her that she may switch the pt to Ozempic.  Is this still an option?    Please call patient to let her know whether she is to change to Ozempic or wait until Munjaro is available.  She will use [facility] for this med      Call her back at [phone]

## 2023-08-17 ENCOUNTER — OFFICE VISIT (OUTPATIENT)
Dept: FAMILY MEDICINE | Facility: CLINIC | Age: 38
End: 2023-08-17
Payer: COMMERCIAL

## 2023-08-17 VITALS
BODY MASS INDEX: 26.46 KG/M2 | SYSTOLIC BLOOD PRESSURE: 122 MMHG | WEIGHT: 155 LBS | HEIGHT: 64 IN | DIASTOLIC BLOOD PRESSURE: 70 MMHG | TEMPERATURE: 97 F | OXYGEN SATURATION: 96 % | RESPIRATION RATE: 18 BRPM | HEART RATE: 89 BPM

## 2023-08-17 DIAGNOSIS — E78.2 MIXED HYPERLIPIDEMIA: ICD-10-CM

## 2023-08-17 DIAGNOSIS — Z79.899 LONG TERM CURRENT USE OF THERAPEUTIC DRUG: ICD-10-CM

## 2023-08-17 DIAGNOSIS — E11.9 TYPE 2 DIABETES MELLITUS WITHOUT COMPLICATION, WITHOUT LONG-TERM CURRENT USE OF INSULIN: Primary | ICD-10-CM

## 2023-08-17 DIAGNOSIS — F32.A ANXIETY AND DEPRESSION: ICD-10-CM

## 2023-08-17 DIAGNOSIS — F41.9 ANXIETY AND DEPRESSION: ICD-10-CM

## 2023-08-17 PROCEDURE — 99213 OFFICE O/P EST LOW 20 MIN: CPT | Mod: ,,, | Performed by: NURSE PRACTITIONER

## 2023-08-17 PROCEDURE — 3074F SYST BP LT 130 MM HG: CPT | Mod: CPTII,,, | Performed by: NURSE PRACTITIONER

## 2023-08-17 PROCEDURE — 1159F MED LIST DOCD IN RCRD: CPT | Mod: CPTII,,, | Performed by: NURSE PRACTITIONER

## 2023-08-17 PROCEDURE — 99213 PR OFFICE/OUTPT VISIT, EST, LEVL III, 20-29 MIN: ICD-10-PCS | Mod: ,,, | Performed by: NURSE PRACTITIONER

## 2023-08-17 PROCEDURE — 3074F PR MOST RECENT SYSTOLIC BLOOD PRESSURE < 130 MM HG: ICD-10-PCS | Mod: CPTII,,, | Performed by: NURSE PRACTITIONER

## 2023-08-17 PROCEDURE — 2023F PR DILATED RETINAL EXAM W/O EVID OF RETINOPATHY: ICD-10-PCS | Mod: CPTII,,, | Performed by: NURSE PRACTITIONER

## 2023-08-17 PROCEDURE — 2023F DILAT RTA XM W/O RTNOPTHY: CPT | Mod: CPTII,,, | Performed by: NURSE PRACTITIONER

## 2023-08-17 PROCEDURE — 3078F DIAST BP <80 MM HG: CPT | Mod: CPTII,,, | Performed by: NURSE PRACTITIONER

## 2023-08-17 PROCEDURE — 3008F PR BODY MASS INDEX (BMI) DOCUMENTED: ICD-10-PCS | Mod: CPTII,,, | Performed by: NURSE PRACTITIONER

## 2023-08-17 PROCEDURE — 3008F BODY MASS INDEX DOCD: CPT | Mod: CPTII,,, | Performed by: NURSE PRACTITIONER

## 2023-08-17 PROCEDURE — 1159F PR MEDICATION LIST DOCUMENTED IN MEDICAL RECORD: ICD-10-PCS | Mod: CPTII,,, | Performed by: NURSE PRACTITIONER

## 2023-08-17 PROCEDURE — 3078F PR MOST RECENT DIASTOLIC BLOOD PRESSURE < 80 MM HG: ICD-10-PCS | Mod: CPTII,,, | Performed by: NURSE PRACTITIONER

## 2023-08-17 RX ORDER — BREXPIPRAZOLE 1 MG/1
1 TABLET ORAL DAILY
Qty: 30 TABLET | Refills: 2 | Status: SHIPPED | OUTPATIENT
Start: 2023-08-17 | End: 2023-09-13

## 2023-08-17 NOTE — PROGRESS NOTES
"SUBJECTIVE:  Nuris Montgomery is a 38 y.o. female here for 1 month f/u medication (Pt states hands/feet are constantly "shaking" since going up on dose.) and Medication Refill (trazodone)      Medication Refill  Pertinent negatives include no arthralgias, chest pain, headaches, joint swelling, neck pain, vomiting or weakness.     Presents to the clinic in follow-up on chronic Rexulti.  She has noticed shaking of her hand and feet since increasing the dose of Rexulti.  She is doing well on Monjouro at 12.5 mg since there is no 15 mg available.  She is fasting for labs today.    Nuris's allergies, medications, history, and problem list were updated as appropriate.    Review of Systems   Constitutional:  Negative for activity change and unexpected weight change.   HENT:  Negative for hearing loss, rhinorrhea and trouble swallowing.    Eyes:  Negative for discharge and visual disturbance.   Respiratory:  Negative for chest tightness and wheezing.    Cardiovascular:  Negative for chest pain and palpitations.   Gastrointestinal:  Negative for blood in stool, constipation, diarrhea and vomiting.   Endocrine: Negative for polydipsia and polyuria.   Genitourinary:  Negative for difficulty urinating, dysuria, hematuria and menstrual problem.   Musculoskeletal:  Negative for arthralgias, joint swelling and neck pain.   Neurological:  Negative for weakness and headaches.   Psychiatric/Behavioral:  Positive for dysphoric mood. Negative for confusion.       A comprehensive review of symptoms was completed and negative except as noted above.    No results found for this or any previous visit (from the past 504 hour(s)).    OBJECTIVE:  Vital signs  Vitals:    08/17/23 0821   BP: 122/70   BP Location: Left arm   Patient Position: Sitting   Pulse: 89   Resp: 18   Temp: 97.3 °F (36.3 °C)   TempSrc: Temporal   SpO2: 96%   Weight: 70.3 kg (155 lb)   Height: 5' 4" (1.626 m)        Physical Exam  Constitutional:       Appearance: Normal " appearance.   HENT:      Head: Normocephalic and atraumatic.      Right Ear: Tympanic membrane and external ear normal.      Left Ear: Tympanic membrane and external ear normal.      Nose: Nose normal.      Mouth/Throat:      Mouth: Mucous membranes are moist.      Pharynx: Oropharynx is clear.   Eyes:      Extraocular Movements: Extraocular movements intact.      Conjunctiva/sclera: Conjunctivae normal.      Pupils: Pupils are equal, round, and reactive to light.   Cardiovascular:      Rate and Rhythm: Normal rate and regular rhythm.   Pulmonary:      Effort: Pulmonary effort is normal.      Breath sounds: Normal breath sounds.   Abdominal:      General: Bowel sounds are normal.      Palpations: Abdomen is soft.   Musculoskeletal:         General: Normal range of motion.      Cervical back: Normal range of motion and neck supple.   Skin:     General: Skin is warm.      Capillary Refill: Capillary refill takes less than 2 seconds.   Neurological:      Mental Status: She is alert.   Psychiatric:         Mood and Affect: Mood normal.         Behavior: Behavior normal.         Judgment: Judgment normal.          ASSESSMENT/PLAN:  1. Type 2 diabetes mellitus without complication, without long-term current use of insulin  -     Hemoglobin A1C ([facility] ONLY)    2. Mixed hyperlipidemia  -     Lipid Panel    3. Long term current use of therapeutic drug  -     CBC Auto Differential  -     Comprehensive Metabolic Panel  -     Vitamin D  -     TSH    4. Anxiety and depression  Comments:  decrease Resulti to 1 mg dialy  Orders:  -     brexpiprazole (REXULTI) 1 mg Tab; Take 1 tablet (1 mg total) by mouth Daily.  Dispense: 30 tablet; Refill: 2        Follow Up:  Follow up in about 1 month (around 9/17/2023).

## 2023-08-18 LAB
25(OH)D3+25(OH)D2 SERPL-MCNC: 36.3 NG/ML (ref 30–100)
ALBUMIN SERPL-MCNC: 4.6 G/DL (ref 3.9–4.9)
ALBUMIN/GLOB SERPL: 1.6 {RATIO} (ref 1.2–2.2)
ALP SERPL-CCNC: 66 IU/L (ref 44–121)
ALT SERPL-CCNC: 19 IU/L (ref 0–32)
AST SERPL-CCNC: 16 IU/L (ref 0–40)
BASOPHILS # BLD AUTO: 0.1 X10E3/UL (ref 0–0.2)
BASOPHILS NFR BLD AUTO: 1 %
BILIRUB SERPL-MCNC: 1 MG/DL (ref 0–1.2)
BUN SERPL-MCNC: 8 MG/DL (ref 6–20)
BUN/CREAT SERPL: 9 (ref 9–23)
CALCIUM SERPL-MCNC: 10.1 MG/DL (ref 8.7–10.2)
CHLORIDE SERPL-SCNC: 101 MMOL/L (ref 96–106)
CHOLEST SERPL-MCNC: 190 MG/DL (ref 100–199)
CO2 SERPL-SCNC: 23 MMOL/L (ref 20–29)
CREAT SERPL-MCNC: 0.86 MG/DL (ref 0.57–1)
EOSINOPHIL # BLD AUTO: 0.1 X10E3/UL (ref 0–0.4)
EOSINOPHIL NFR BLD AUTO: 1 %
ERYTHROCYTE [DISTWIDTH] IN BLOOD BY AUTOMATED COUNT: 12.1 % (ref 11.7–15.4)
EST. GFR  (NO RACE VARIABLE): 90 ML/MIN/1.73
GLOBULIN SER CALC-MCNC: 2.8 G/DL (ref 1.5–4.5)
GLUCOSE SERPL-MCNC: 91 MG/DL (ref 70–99)
HBA1C MFR BLD: 5.2 % (ref 4.8–5.6)
HCT VFR BLD AUTO: 39.5 % (ref 34–46.6)
HDLC SERPL-MCNC: 59 MG/DL
HGB BLD-MCNC: 13.9 G/DL (ref 11.1–15.9)
IMM GRANULOCYTES NFR BLD AUTO: 0 %
LDLC SERPL CALC-MCNC: 117 MG/DL (ref 0–99)
LYMPHOCYTES # BLD AUTO: 2.2 X10E3/UL (ref 0.7–3.1)
LYMPHOCYTES NFR BLD AUTO: 38 %
MCH RBC QN AUTO: 30.6 PG (ref 26.6–33)
MCHC RBC AUTO-ENTMCNC: 35.2 G/DL (ref 31.5–35.7)
MCV RBC AUTO: 87 FL (ref 79–97)
MONOCYTES # BLD AUTO: 0.3 X10E3/UL (ref 0.1–0.9)
MONOCYTES NFR BLD AUTO: 6 %
NEUTROPHILS # BLD AUTO: 3.2 X10E3/UL (ref 1.4–7)
NEUTROPHILS NFR BLD AUTO: 54 %
PLATELET # BLD AUTO: 245 X10E3/UL (ref 150–450)
POTASSIUM SERPL-SCNC: 4.6 MMOL/L (ref 3.5–5.2)
PROT SERPL-MCNC: 7.4 G/DL (ref 6–8.5)
RBC # BLD AUTO: 4.54 X10E6/UL (ref 3.77–5.28)
SODIUM SERPL-SCNC: 139 MMOL/L (ref 134–144)
TRIGL SERPL-MCNC: 78 MG/DL (ref 0–149)
TSH SERPL DL<=0.005 MIU/L-ACNC: 1.21 UIU/ML (ref 0.45–4.5)
VLDLC SERPL CALC-MCNC: 14 MG/DL (ref 5–40)
WBC # BLD AUTO: 5.8 X10E3/UL (ref 3.4–10.8)

## 2023-08-21 ENCOUNTER — TELEPHONE (OUTPATIENT)
Dept: FAMILY MEDICINE | Facility: CLINIC | Age: 38
End: 2023-08-21
Payer: COMMERCIAL

## 2023-08-21 DIAGNOSIS — G47.00 INSOMNIA, UNSPECIFIED TYPE: ICD-10-CM

## 2023-08-21 RX ORDER — TRAZODONE HYDROCHLORIDE 50 MG/1
TABLET ORAL
Qty: 30 TABLET | Refills: 2 | Status: SHIPPED | OUTPATIENT
Start: 2023-08-21 | End: 2023-11-16

## 2023-08-21 NOTE — TELEPHONE ENCOUNTER
----- Message from Farideh Villatoro NP sent at 8/21/2023  7:38 AM CDT -----  Please notify patient of results.  They look good.  No medication changes.

## 2023-08-28 ENCOUNTER — TELEPHONE (OUTPATIENT)
Dept: FAMILY MEDICINE | Facility: CLINIC | Age: 38
End: 2023-08-28
Payer: COMMERCIAL

## 2023-08-28 DIAGNOSIS — E11.9 TYPE 2 DIABETES MELLITUS WITHOUT COMPLICATION, WITHOUT LONG-TERM CURRENT USE OF INSULIN: ICD-10-CM

## 2023-08-28 NOTE — TELEPHONE ENCOUNTER
----- Message from Stacy Wick MA sent at 8/28/2023  1:24 PM CDT -----  Farideh Zarate [address]        [facility] [address]

## 2023-09-13 ENCOUNTER — OFFICE VISIT (OUTPATIENT)
Dept: FAMILY MEDICINE | Facility: CLINIC | Age: 38
End: 2023-09-13
Payer: COMMERCIAL

## 2023-09-13 ENCOUNTER — ANESTHESIA EVENT (OUTPATIENT)
Dept: SURGERY | Facility: HOSPITAL | Age: 38
End: 2023-09-13
Payer: COMMERCIAL

## 2023-09-13 VITALS
WEIGHT: 150 LBS | DIASTOLIC BLOOD PRESSURE: 78 MMHG | TEMPERATURE: 98 F | HEART RATE: 73 BPM | HEIGHT: 64 IN | SYSTOLIC BLOOD PRESSURE: 117 MMHG | OXYGEN SATURATION: 99 % | RESPIRATION RATE: 20 BRPM | BODY MASS INDEX: 25.61 KG/M2

## 2023-09-13 DIAGNOSIS — F32.A ANXIETY AND DEPRESSION: Primary | ICD-10-CM

## 2023-09-13 DIAGNOSIS — F41.9 ANXIETY AND DEPRESSION: Primary | ICD-10-CM

## 2023-09-13 DIAGNOSIS — E11.9 TYPE 2 DIABETES MELLITUS WITHOUT COMPLICATION, WITHOUT LONG-TERM CURRENT USE OF INSULIN: ICD-10-CM

## 2023-09-13 PROCEDURE — 2023F PR DILATED RETINAL EXAM W/O EVID OF RETINOPATHY: ICD-10-PCS | Mod: CPTII,,, | Performed by: NURSE PRACTITIONER

## 2023-09-13 PROCEDURE — 3008F PR BODY MASS INDEX (BMI) DOCUMENTED: ICD-10-PCS | Mod: CPTII,,, | Performed by: NURSE PRACTITIONER

## 2023-09-13 PROCEDURE — 3074F SYST BP LT 130 MM HG: CPT | Mod: CPTII,,, | Performed by: NURSE PRACTITIONER

## 2023-09-13 PROCEDURE — 3078F DIAST BP <80 MM HG: CPT | Mod: CPTII,,, | Performed by: NURSE PRACTITIONER

## 2023-09-13 PROCEDURE — 2023F DILAT RTA XM W/O RTNOPTHY: CPT | Mod: CPTII,,, | Performed by: NURSE PRACTITIONER

## 2023-09-13 PROCEDURE — 3078F PR MOST RECENT DIASTOLIC BLOOD PRESSURE < 80 MM HG: ICD-10-PCS | Mod: CPTII,,, | Performed by: NURSE PRACTITIONER

## 2023-09-13 PROCEDURE — 3044F HG A1C LEVEL LT 7.0%: CPT | Mod: CPTII,,, | Performed by: NURSE PRACTITIONER

## 2023-09-13 PROCEDURE — 1159F PR MEDICATION LIST DOCUMENTED IN MEDICAL RECORD: ICD-10-PCS | Mod: CPTII,,, | Performed by: NURSE PRACTITIONER

## 2023-09-13 PROCEDURE — 3008F BODY MASS INDEX DOCD: CPT | Mod: CPTII,,, | Performed by: NURSE PRACTITIONER

## 2023-09-13 PROCEDURE — 1159F MED LIST DOCD IN RCRD: CPT | Mod: CPTII,,, | Performed by: NURSE PRACTITIONER

## 2023-09-13 PROCEDURE — 99213 OFFICE O/P EST LOW 20 MIN: CPT | Mod: ,,, | Performed by: NURSE PRACTITIONER

## 2023-09-13 PROCEDURE — 3044F PR MOST RECENT HEMOGLOBIN A1C LEVEL <7.0%: ICD-10-PCS | Mod: CPTII,,, | Performed by: NURSE PRACTITIONER

## 2023-09-13 PROCEDURE — 3074F PR MOST RECENT SYSTOLIC BLOOD PRESSURE < 130 MM HG: ICD-10-PCS | Mod: CPTII,,, | Performed by: NURSE PRACTITIONER

## 2023-09-13 PROCEDURE — 99213 PR OFFICE/OUTPT VISIT, EST, LEVL III, 20-29 MIN: ICD-10-PCS | Mod: ,,, | Performed by: NURSE PRACTITIONER

## 2023-09-13 RX ORDER — CARIPRAZINE 1.5 MG/1
1.5 CAPSULE, GELATIN COATED ORAL DAILY
Qty: 14 CAPSULE | Refills: 0 | COMMUNITY
Start: 2023-09-13 | End: 2023-10-10 | Stop reason: SDUPTHER

## 2023-09-13 NOTE — PROGRESS NOTES
"SUBJECTIVE:  Nuris Montgomery is a 38 y.o. female here for Follow-up (Pt here for follow up - she says she stopped taking the rexulti due to feeling like it was making it worse. )      HPI  Presents to the clinic in follow-up.  She stopped Rexulti because she felt like it was making her more anxious.  She reports she has a lot of home stressors and feels anxious often and depressed.  She is scheduled for a AYLEEN next week. She is holding this week's dose of Monjouro.     [place] allergies, medications, history, and problem list were updated as appropriate.    Review of Systems   Constitutional:  Negative for activity change and unexpected weight change.   HENT:  Negative for hearing loss, rhinorrhea and trouble swallowing.    Eyes:  Negative for discharge and visual disturbance.   Respiratory:  Negative for chest tightness and wheezing.    Cardiovascular:  Negative for chest pain and palpitations.   Gastrointestinal:  Negative for blood in stool, constipation, diarrhea and vomiting.   Endocrine: Negative for polydipsia and polyuria.   Genitourinary:  Negative for difficulty urinating, dysuria, hematuria and menstrual problem.   Musculoskeletal:  Negative for arthralgias, joint swelling and neck pain.   Neurological:  Negative for weakness and headaches.   Psychiatric/Behavioral:  Negative for confusion and dysphoric mood. The patient is nervous/anxious.       A comprehensive review of symptoms was completed and negative except as noted above.    No results found for this or any previous visit (from the past 504 hour(s)).    OBJECTIVE:  Vital signs  Vitals:    09/13/23 0839   BP: 117/78   BP Location: Right arm   Patient Position: Sitting   Pulse: 73   Resp: 20   Temp: 97.5 °F (36.4 °C)   SpO2: 99%   Weight: 68 kg (150 lb)   Height: 5' 4" (1.626 m)        Physical Exam  Constitutional:       Appearance: Normal appearance.   HENT:      Head: Normocephalic and atraumatic.      Nose: Nose normal.      Mouth/Throat:      " Mouth: Mucous membranes are moist.      Pharynx: Oropharynx is clear.   Eyes:      Conjunctiva/sclera: Conjunctivae normal.   Cardiovascular:      Rate and Rhythm: Normal rate and regular rhythm.   Pulmonary:      Effort: Pulmonary effort is normal.      Breath sounds: Normal breath sounds.   Abdominal:      General: Bowel sounds are normal.      Palpations: Abdomen is soft.   Skin:     General: Skin is warm.      Capillary Refill: Capillary refill takes less than 2 seconds.   Neurological:      Mental Status: She is alert.   Psychiatric:         Mood and Affect: Mood is anxious and depressed.         Behavior: Behavior normal.         Judgment: Judgment normal.          ASSESSMENT/PLAN:  1. Anxiety and depression  Comments:  instructed not to start Vraylar till after her upcoming surgery  Orders:  -     cariprazine (VRAYLAR) 1.5 mg Cap; Take 1 capsule (1.5 mg total) by mouth Daily.  Dispense: 14 capsule; Refill: 0    2. Type 2 diabetes mellitus without complication, without long-term current use of insulin  Comments:  stable with current medication        Follow Up:  Follow up in about 1 month (around 10/13/2023).

## 2023-09-15 ENCOUNTER — LAB VISIT (OUTPATIENT)
Dept: LAB | Facility: HOSPITAL | Age: 38
End: 2023-09-15
Attending: OBSTETRICS & GYNECOLOGY
Payer: COMMERCIAL

## 2023-09-15 DIAGNOSIS — D25.1 FIBROIDS, INTRAMURAL: Primary | ICD-10-CM

## 2023-09-15 LAB
B-HCG SERPL QL: NEGATIVE
ERYTHROCYTE [DISTWIDTH] IN BLOOD BY AUTOMATED COUNT: 12.3 % (ref 11.5–17)
HCT VFR BLD AUTO: 42.1 % (ref 37–47)
HGB BLD-MCNC: 14.2 G/DL (ref 12–16)
MCH RBC QN AUTO: 29.8 PG (ref 27–31)
MCHC RBC AUTO-ENTMCNC: 33.7 G/DL (ref 33–36)
MCV RBC AUTO: 88.4 FL (ref 80–94)
NRBC BLD AUTO-RTO: 0 %
PLATELET # BLD AUTO: 262 X10(3)/MCL (ref 130–400)
PMV BLD AUTO: 9.5 FL (ref 7.4–10.4)
RBC # BLD AUTO: 4.76 X10(6)/MCL (ref 4.2–5.4)
WBC # SPEC AUTO: 6.91 X10(3)/MCL (ref 4.5–11.5)

## 2023-09-15 PROCEDURE — 81025 URINE PREGNANCY TEST: CPT

## 2023-09-15 PROCEDURE — 85027 COMPLETE CBC AUTOMATED: CPT

## 2023-09-15 PROCEDURE — 36415 COLL VENOUS BLD VENIPUNCTURE: CPT

## 2023-09-17 ENCOUNTER — PATIENT MESSAGE (OUTPATIENT)
Dept: ADMINISTRATIVE | Facility: OTHER | Age: 38
End: 2023-09-17
Payer: COMMERCIAL

## 2023-09-18 ENCOUNTER — PATIENT MESSAGE (OUTPATIENT)
Dept: ADMINISTRATIVE | Facility: OTHER | Age: 38
End: 2023-09-18
Payer: COMMERCIAL

## 2023-09-19 ENCOUNTER — PATIENT MESSAGE (OUTPATIENT)
Dept: ADMINISTRATIVE | Facility: OTHER | Age: 38
End: 2023-09-19
Payer: COMMERCIAL

## 2023-09-19 DIAGNOSIS — F32.A ANXIETY AND DEPRESSION: ICD-10-CM

## 2023-09-19 DIAGNOSIS — F41.9 ANXIETY AND DEPRESSION: ICD-10-CM

## 2023-09-19 RX ORDER — ESCITALOPRAM OXALATE 20 MG/1
20 TABLET ORAL
Qty: 30 TABLET | Refills: 2 | Status: SHIPPED | OUTPATIENT
Start: 2023-09-19 | End: 2023-12-19

## 2023-09-19 NOTE — ANESTHESIA PREPROCEDURE EVALUATION
"                                                                                                             09/19/2023  Nuris Montgomery is a 38 y.o., female with ----------------------------  Adenomyosis  Anxiety disorder, unspecified  Chronic cholecystitis  Depression  Diabetes mellitus  Fatty liver  Fibroids, intramural  GERD (gastroesophageal reflux disease)  Heavy menstrual bleeding  History of kidney stones  HLD (hyperlipidemia)  Irregular menstruation  PATRIZIA on CPAP  Pelvic pain  Respiratory distress      Comment:  "oxygen drops c anesthesia"    And ----------------------------  Dental surgery  Laparoscopic cholecystectomy      Comment:  Procedure: CHOLECYSTECTOMY, LAPAROSCOPIC;  Surgeon:                Dalton Boateng MD;  Location: [facility];  Service:                General;  Laterality: N/A;  Lithotripsy  Replacement of nephrostomy tube  Ureteral stent placement    Presents for Robotic Hysterectomy due to fibroids.      Pre-op Assessment    I have reviewed the NPO Status.      Review of Systems      Physical Exam  General: Well nourished, Cooperative, Alert and Oriented    Airway:  Mallampati: II   Mouth Opening: Normal  TM Distance: Normal  Tongue: Normal  Neck ROM: Normal ROM    Dental:  Intact    Chest/Lungs:  Clear to auscultation, Normal Respiratory Rate    Heart:  Rate: Normal  Rhythm: Regular Rhythm       Latest Reference Range & Units 08/17/23 07:30 09/15/23 10:44   WBC 4.50 - 11.50 x10(3)/mcL 5.8 6.91   Hemoglobin 12.0 - 16.0 g/dL 13.9 14.2   Hematocrit 37.0 - 47.0 % 39.5 42.1   Platelets 130 - 400 x10(3)/mcL 245 262   Sodium 134 - 144 mmol/L 139    Potassium 3.5 - 5.2 mmol/L 4.6    Chloride 96 - 106 mmol/L 101    CO2 20 - 29 mmol/L 23    BUN 6 - 20 mg/dL 8    Creatinine 0.57 - 1.00 mg/dL 0.86    BUN/CREAT RATIO 9 - 23  9    eGFR >59 mL/min/1.73 90        Anesthesia Plan  Type of Anesthesia, risks & benefits discussed:    Anesthesia Type: Gen ETT  Intra-op Monitoring Plan: Standard ASA " Monitors  Post Op Pain Control Plan: multimodal analgesia and IV/PO Opioids PRN  Induction:  IV  Airway Plan: Direct, Post-Induction  Informed Consent: Informed consent signed with the Patient and all parties understand the risks and agree with anesthesia plan.  All questions answered.   ASA Score: 2  Day of Surgery Review of History & Physical: H&P Update referred to the surgeon/provider.  Anesthesia Plan Notes: Premedication: Midazolam  Special Technique: Preemptive analgesia with Neurontin, zofran, acetaminophen and celebrex  or tramadol  PONV prophylaxis with intraop zofran, decadron     Ready For Surgery From Anesthesia Perspective.     .

## 2023-09-19 NOTE — PRE-PROCEDURE INSTRUCTIONS
[facility]: Outpatient Surgery  Preprocedure Check-In Instructions     Your arrival time for your surgery or procedure is __0500____.  We ask patients to arrive about 2 hours before surgery to allow for enough time to review your health history & medications, start your IV, complete any outstanding labwork or tests, and meet your Anesthesiologist.  You will arrive at [facility], [address] [place] [address].  Enter through the [address] entrance next to the Emergency Room, and come to the 6th floor to the Outpatient Surgery Department.     Visitory Policy:  You are allowed 2 adult visitors to be with you in the hospital. All hospital visitors should be in good current health.  No small children.     What to Bring:  Please have your ID, insurance cards, and all home medication bottles with you at check in.  Bring your CPAP machine if one is used at home.     Fasting:  Nothing to eat or drink after midnight the night before your procedure. This includes no ice, gum, hard candies, and/or tobacco products.  Follow your doctor's instructions for taking any medications on the morning of your procedure.  If no instructions for taking medications were given, do not take any medications but bring your medications in their bottles to your procedure check in.     Follow your doctor's preoperative instructions regarding skin prep, bowel prep, bathing, or showering prior to your procedure.  If any special soaps were provided to you, please use according to your doctor's instructions. If no instructions were given from your doctor, take a good bath or shower with antibacterial soap the night before and the morning of your procedure.  On the morning of procedure, wear loose, comfortable clothing.  No lotions, makeup, perfumes, colognes, deodorant, or jewelry to your procedure.  Removable items (glasses, contact lenses, dentures, retainers, hearing aids) need to be removed for your  procedure.  Bring your storage containers for these items if you wear them.     Artificial nails, body jewelry, eyelash extensions, and/or hair extensions with metal clips are not allowed during your surgery.  If you currently wear any of these items, please arrange for them to be removed prior to your arrival to the hospital.     Outpatient or Same Day Surgeries:  Any patients receiving sedation/anesthesia are advised not to drive for 24 hours after their procedure.  We do not allow patients to drive themselves home after discharge.  If you are going home after your procedure, please have someone available to drive you home from the hospital.        You may call the Outpatient Surgery Department at [phone] with any questions or concerns.  We are looking forward to meeting you and taking great care of you for your procedure.  Thank you for choosing [facility] for your surgical needs.

## 2023-09-20 ENCOUNTER — ANESTHESIA (OUTPATIENT)
Dept: SURGERY | Facility: HOSPITAL | Age: 38
End: 2023-09-20
Payer: COMMERCIAL

## 2023-09-20 ENCOUNTER — HOSPITAL ENCOUNTER (OUTPATIENT)
Facility: HOSPITAL | Age: 38
Discharge: HOME | End: 2023-09-21
Attending: OBSTETRICS & GYNECOLOGY | Admitting: OBSTETRICS & GYNECOLOGY
Payer: COMMERCIAL

## 2023-09-20 DIAGNOSIS — N93.9 ABNORMAL UTERINE BLEEDING (AUB): ICD-10-CM

## 2023-09-20 DIAGNOSIS — D25.1 FIBROIDS, INTRAMURAL: ICD-10-CM

## 2023-09-20 LAB
B-HCG UR QL: NEGATIVE
CTP QC/QA: YES
GLUCOSE SERPL-MCNC: 89 MG/DL (ref 70–110)
GROUP & RH: NORMAL
INDIRECT COOMBS GEL: NORMAL
POCT GLUCOSE: 75 MG/DL (ref 70–110)
POCT GLUCOSE: 89 MG/DL (ref 70–110)
POCT GLUCOSE: 93 MG/DL (ref 70–110)
SPECIMEN OUTDATE: NORMAL

## 2023-09-20 PROCEDURE — 82962 GLUCOSE BLOOD TEST: CPT | Performed by: OBSTETRICS & GYNECOLOGY

## 2023-09-20 PROCEDURE — 25000003 PHARM REV CODE 250: Performed by: OBSTETRICS & GYNECOLOGY

## 2023-09-20 PROCEDURE — 86850 RBC ANTIBODY SCREEN: CPT | Performed by: ANESTHESIOLOGY

## 2023-09-20 PROCEDURE — 25000003 PHARM REV CODE 250: Performed by: ANESTHESIOLOGY

## 2023-09-20 PROCEDURE — 63600175 PHARM REV CODE 636 W HCPCS: Performed by: OBSTETRICS & GYNECOLOGY

## 2023-09-20 PROCEDURE — G0378 HOSPITAL OBSERVATION PER HR: HCPCS

## 2023-09-20 PROCEDURE — D9220A PRA ANESTHESIA: ICD-10-PCS | Mod: CRNA,,, | Performed by: NURSE ANESTHETIST, CERTIFIED REGISTERED

## 2023-09-20 PROCEDURE — 37000008 HC ANESTHESIA 1ST 15 MINUTES: Performed by: OBSTETRICS & GYNECOLOGY

## 2023-09-20 PROCEDURE — 71000039 HC RECOVERY, EACH ADD'L HOUR: Performed by: OBSTETRICS & GYNECOLOGY

## 2023-09-20 PROCEDURE — 25000003 PHARM REV CODE 250: Performed by: NURSE ANESTHETIST, CERTIFIED REGISTERED

## 2023-09-20 PROCEDURE — 63600175 PHARM REV CODE 636 W HCPCS: Performed by: NURSE ANESTHETIST, CERTIFIED REGISTERED

## 2023-09-20 PROCEDURE — 82962 GLUCOSE BLOOD TEST: CPT | Mod: 91 | Performed by: OBSTETRICS & GYNECOLOGY

## 2023-09-20 PROCEDURE — 36000713 HC OR TIME LEV V EA ADD 15 MIN: Performed by: OBSTETRICS & GYNECOLOGY

## 2023-09-20 PROCEDURE — 71000015 HC POSTOP RECOV 1ST HR: Performed by: OBSTETRICS & GYNECOLOGY

## 2023-09-20 PROCEDURE — 96361 HYDRATE IV INFUSION ADD-ON: CPT | Mod: 59

## 2023-09-20 PROCEDURE — 37000009 HC ANESTHESIA EA ADD 15 MINS: Performed by: OBSTETRICS & GYNECOLOGY

## 2023-09-20 PROCEDURE — 71000033 HC RECOVERY, INTIAL HOUR: Performed by: OBSTETRICS & GYNECOLOGY

## 2023-09-20 PROCEDURE — 96374 THER/PROPH/DIAG INJ IV PUSH: CPT | Mod: 59

## 2023-09-20 PROCEDURE — D9220A PRA ANESTHESIA: Mod: CRNA,,, | Performed by: NURSE ANESTHETIST, CERTIFIED REGISTERED

## 2023-09-20 PROCEDURE — D9220A PRA ANESTHESIA: ICD-10-PCS | Mod: ANES,,, | Performed by: ANESTHESIOLOGY

## 2023-09-20 PROCEDURE — D9220A PRA ANESTHESIA: Mod: ANES,,, | Performed by: ANESTHESIOLOGY

## 2023-09-20 PROCEDURE — 81025 URINE PREGNANCY TEST: CPT | Performed by: OBSTETRICS & GYNECOLOGY

## 2023-09-20 PROCEDURE — 27201423 OPTIME MED/SURG SUP & DEVICES STERILE SUPPLY: Performed by: OBSTETRICS & GYNECOLOGY

## 2023-09-20 PROCEDURE — 63600175 PHARM REV CODE 636 W HCPCS: Performed by: ANESTHESIOLOGY

## 2023-09-20 PROCEDURE — 36000712 HC OR TIME LEV V 1ST 15 MIN: Performed by: OBSTETRICS & GYNECOLOGY

## 2023-09-20 RX ORDER — SODIUM CHLORIDE, SODIUM LACTATE, POTASSIUM CHLORIDE, CALCIUM CHLORIDE 600; 310; 30; 20 MG/100ML; MG/100ML; MG/100ML; MG/100ML
INJECTION, SOLUTION INTRAVENOUS CONTINUOUS
Status: DISCONTINUED | OUTPATIENT
Start: 2023-09-20 | End: 2023-09-21 | Stop reason: HOSPADM

## 2023-09-20 RX ORDER — ONDANSETRON 2 MG/ML
4 INJECTION INTRAMUSCULAR; INTRAVENOUS DAILY PRN
Status: DISCONTINUED | OUTPATIENT
Start: 2023-09-20 | End: 2023-09-20 | Stop reason: HOSPADM

## 2023-09-20 RX ORDER — HYDROMORPHONE HYDROCHLORIDE 2 MG/ML
0.4 INJECTION, SOLUTION INTRAMUSCULAR; INTRAVENOUS; SUBCUTANEOUS EVERY 5 MIN PRN
Status: DISCONTINUED | OUTPATIENT
Start: 2023-09-20 | End: 2023-09-20 | Stop reason: HOSPADM

## 2023-09-20 RX ORDER — DIPHENHYDRAMINE HCL 25 MG
25 CAPSULE ORAL EVERY 4 HOURS PRN
Status: DISCONTINUED | OUTPATIENT
Start: 2023-09-20 | End: 2023-09-21 | Stop reason: HOSPADM

## 2023-09-20 RX ORDER — ROCURONIUM BROMIDE 10 MG/ML
INJECTION, SOLUTION INTRAVENOUS
Status: DISCONTINUED | OUTPATIENT
Start: 2023-09-20 | End: 2023-09-20

## 2023-09-20 RX ORDER — ACETAMINOPHEN 500 MG
1000 TABLET ORAL
Status: COMPLETED | OUTPATIENT
Start: 2023-09-20 | End: 2023-09-20

## 2023-09-20 RX ORDER — MEPERIDINE HYDROCHLORIDE 25 MG/ML
6.25 INJECTION INTRAMUSCULAR; INTRAVENOUS; SUBCUTANEOUS ONCE AS NEEDED
Status: DISCONTINUED | OUTPATIENT
Start: 2023-09-20 | End: 2023-09-20 | Stop reason: HOSPADM

## 2023-09-20 RX ORDER — KETOROLAC TROMETHAMINE 30 MG/ML
30 INJECTION, SOLUTION INTRAMUSCULAR; INTRAVENOUS EVERY 6 HOURS
Status: DISPENSED | OUTPATIENT
Start: 2023-09-20 | End: 2023-09-21

## 2023-09-20 RX ORDER — ONDANSETRON 4 MG/1
8 TABLET, ORALLY DISINTEGRATING ORAL EVERY 8 HOURS PRN
Status: DISCONTINUED | OUTPATIENT
Start: 2023-09-20 | End: 2023-09-21 | Stop reason: HOSPADM

## 2023-09-20 RX ORDER — ONDANSETRON 2 MG/ML
INJECTION INTRAMUSCULAR; INTRAVENOUS
Status: DISCONTINUED | OUTPATIENT
Start: 2023-09-20 | End: 2023-09-20

## 2023-09-20 RX ORDER — PROPOFOL 10 MG/ML
VIAL (ML) INTRAVENOUS
Status: DISCONTINUED | OUTPATIENT
Start: 2023-09-20 | End: 2023-09-20

## 2023-09-20 RX ORDER — CEFAZOLIN SODIUM 1 G/3ML
1 INJECTION, POWDER, FOR SOLUTION INTRAMUSCULAR; INTRAVENOUS
Status: DISCONTINUED | OUTPATIENT
Start: 2023-09-20 | End: 2023-09-20 | Stop reason: HOSPADM

## 2023-09-20 RX ORDER — PHENYLEPHRINE HYDROCHLORIDE 10 MG/ML
INJECTION INTRAVENOUS
Status: DISCONTINUED | OUTPATIENT
Start: 2023-09-20 | End: 2023-09-20

## 2023-09-20 RX ORDER — GLYCOPYRROLATE 0.2 MG/ML
INJECTION INTRAMUSCULAR; INTRAVENOUS
Status: DISCONTINUED | OUTPATIENT
Start: 2023-09-20 | End: 2023-09-20

## 2023-09-20 RX ORDER — PROCHLORPERAZINE EDISYLATE 5 MG/ML
5 INJECTION INTRAMUSCULAR; INTRAVENOUS EVERY 6 HOURS PRN
Status: DISCONTINUED | OUTPATIENT
Start: 2023-09-20 | End: 2023-09-21 | Stop reason: HOSPADM

## 2023-09-20 RX ORDER — SIMETHICONE 80 MG
80 TABLET,CHEWABLE ORAL EVERY 4 HOURS PRN
Status: DISCONTINUED | OUTPATIENT
Start: 2023-09-20 | End: 2023-09-21 | Stop reason: HOSPADM

## 2023-09-20 RX ORDER — HYDROMORPHONE HYDROCHLORIDE 2 MG/ML
INJECTION, SOLUTION INTRAMUSCULAR; INTRAVENOUS; SUBCUTANEOUS
Status: DISCONTINUED | OUTPATIENT
Start: 2023-09-20 | End: 2023-09-20

## 2023-09-20 RX ORDER — LIDOCAINE HYDROCHLORIDE 10 MG/ML
1 INJECTION, SOLUTION EPIDURAL; INFILTRATION; INTRACAUDAL; PERINEURAL ONCE
Status: DISCONTINUED | OUTPATIENT
Start: 2023-09-20 | End: 2023-09-20 | Stop reason: HOSPADM

## 2023-09-20 RX ORDER — FENTANYL CITRATE 50 UG/ML
INJECTION, SOLUTION INTRAMUSCULAR; INTRAVENOUS
Status: DISCONTINUED | OUTPATIENT
Start: 2023-09-20 | End: 2023-09-20

## 2023-09-20 RX ORDER — ONDANSETRON 4 MG/1
4 TABLET, ORALLY DISINTEGRATING ORAL
Status: COMPLETED | OUTPATIENT
Start: 2023-09-20 | End: 2023-09-20

## 2023-09-20 RX ORDER — MIDAZOLAM HYDROCHLORIDE 1 MG/ML
2 INJECTION INTRAMUSCULAR; INTRAVENOUS ONCE AS NEEDED
Status: DISCONTINUED | OUTPATIENT
Start: 2023-09-20 | End: 2023-09-20 | Stop reason: HOSPADM

## 2023-09-20 RX ORDER — GABAPENTIN 300 MG/1
300 CAPSULE ORAL
Status: COMPLETED | OUTPATIENT
Start: 2023-09-20 | End: 2023-09-20

## 2023-09-20 RX ORDER — MIDAZOLAM HYDROCHLORIDE 1 MG/ML
INJECTION INTRAMUSCULAR; INTRAVENOUS
Status: DISCONTINUED | OUTPATIENT
Start: 2023-09-20 | End: 2023-09-20

## 2023-09-20 RX ORDER — HYDROCODONE BITARTRATE AND ACETAMINOPHEN 5; 325 MG/1; MG/1
1 TABLET ORAL
Status: DISCONTINUED | OUTPATIENT
Start: 2023-09-20 | End: 2023-09-21 | Stop reason: HOSPADM

## 2023-09-20 RX ORDER — IBUPROFEN 600 MG/1
600 TABLET ORAL EVERY 6 HOURS PRN
Status: DISCONTINUED | OUTPATIENT
Start: 2023-09-20 | End: 2023-09-21 | Stop reason: HOSPADM

## 2023-09-20 RX ORDER — LIDOCAINE HYDROCHLORIDE 20 MG/ML
INJECTION, SOLUTION EPIDURAL; INFILTRATION; INTRACAUDAL; PERINEURAL
Status: DISCONTINUED | OUTPATIENT
Start: 2023-09-20 | End: 2023-09-20

## 2023-09-20 RX ORDER — KETOROLAC TROMETHAMINE 30 MG/ML
INJECTION, SOLUTION INTRAMUSCULAR; INTRAVENOUS
Status: DISCONTINUED | OUTPATIENT
Start: 2023-09-20 | End: 2023-09-20

## 2023-09-20 RX ORDER — PROCHLORPERAZINE EDISYLATE 5 MG/ML
5 INJECTION INTRAMUSCULAR; INTRAVENOUS EVERY 30 MIN PRN
Status: DISCONTINUED | OUTPATIENT
Start: 2023-09-20 | End: 2023-09-20 | Stop reason: HOSPADM

## 2023-09-20 RX ORDER — SODIUM CHLORIDE, SODIUM GLUCONATE, SODIUM ACETATE, POTASSIUM CHLORIDE AND MAGNESIUM CHLORIDE 30; 37; 368; 526; 502 MG/100ML; MG/100ML; MG/100ML; MG/100ML; MG/100ML
INJECTION, SOLUTION INTRAVENOUS CONTINUOUS
Status: DISCONTINUED | OUTPATIENT
Start: 2023-09-20 | End: 2023-09-21 | Stop reason: HOSPADM

## 2023-09-20 RX ORDER — TRAMADOL HYDROCHLORIDE 50 MG/1
50 TABLET ORAL
Status: COMPLETED | OUTPATIENT
Start: 2023-09-20 | End: 2023-09-20

## 2023-09-20 RX ORDER — DEXAMETHASONE SODIUM PHOSPHATE 4 MG/ML
INJECTION, SOLUTION INTRA-ARTICULAR; INTRALESIONAL; INTRAMUSCULAR; INTRAVENOUS; SOFT TISSUE
Status: DISCONTINUED | OUTPATIENT
Start: 2023-09-20 | End: 2023-09-20

## 2023-09-20 RX ORDER — IBUPROFEN 600 MG/1
600 TABLET ORAL EVERY 6 HOURS PRN
Status: DISCONTINUED | OUTPATIENT
Start: 2023-09-21 | End: 2023-09-21 | Stop reason: HOSPADM

## 2023-09-20 RX ADMIN — LIDOCAINE HYDROCHLORIDE 4 ML: 20 INJECTION, SOLUTION EPIDURAL; INFILTRATION; INTRACAUDAL; PERINEURAL at 07:09

## 2023-09-20 RX ADMIN — PHENYLEPHRINE HYDROCHLORIDE 0.29 MCG/KG/MIN: 10 INJECTION INTRAVENOUS at 07:09

## 2023-09-20 RX ADMIN — SUGAMMADEX 200 MG: 100 INJECTION, SOLUTION INTRAVENOUS at 09:09

## 2023-09-20 RX ADMIN — GLYCOPYRROLATE 0.2 MG: 0.2 INJECTION INTRAMUSCULAR; INTRAVENOUS at 07:09

## 2023-09-20 RX ADMIN — SODIUM CHLORIDE, POTASSIUM CHLORIDE, SODIUM LACTATE AND CALCIUM CHLORIDE: 600; 310; 30; 20 INJECTION, SOLUTION INTRAVENOUS at 11:09

## 2023-09-20 RX ADMIN — IBUPROFEN 600 MG: 600 TABLET ORAL at 01:09

## 2023-09-20 RX ADMIN — TRAMADOL HYDROCHLORIDE 50 MG: 50 TABLET, COATED ORAL at 06:09

## 2023-09-20 RX ADMIN — KETOROLAC TROMETHAMINE 30 MG: 30 INJECTION, SOLUTION INTRAMUSCULAR; INTRAVENOUS at 08:09

## 2023-09-20 RX ADMIN — ROCURONIUM BROMIDE 10 MG: 10 SOLUTION INTRAVENOUS at 08:09

## 2023-09-20 RX ADMIN — HYDROMORPHONE HYDROCHLORIDE 0.4 MG: 2 INJECTION, SOLUTION INTRAMUSCULAR; INTRAVENOUS; SUBCUTANEOUS at 09:09

## 2023-09-20 RX ADMIN — ONDANSETRON 4 MG: 4 TABLET, ORALLY DISINTEGRATING ORAL at 06:09

## 2023-09-20 RX ADMIN — KETOROLAC TROMETHAMINE 30 MG: 30 INJECTION, SOLUTION INTRAMUSCULAR; INTRAVENOUS at 03:09

## 2023-09-20 RX ADMIN — Medication 80 MG: at 03:09

## 2023-09-20 RX ADMIN — DEXAMETHASONE SODIUM PHOSPHATE 4 MG: 4 INJECTION, SOLUTION INTRA-ARTICULAR; INTRALESIONAL; INTRAMUSCULAR; INTRAVENOUS; SOFT TISSUE at 08:09

## 2023-09-20 RX ADMIN — HYDROMORPHONE HYDROCHLORIDE 0.4 MG: 2 INJECTION INTRAMUSCULAR; INTRAVENOUS; SUBCUTANEOUS at 09:09

## 2023-09-20 RX ADMIN — HYDROCODONE BITARTRATE AND ACETAMINOPHEN 1 TABLET: 5; 325 TABLET ORAL at 11:09

## 2023-09-20 RX ADMIN — PROPOFOL 150 MG: 10 INJECTION, EMULSION INTRAVENOUS at 07:09

## 2023-09-20 RX ADMIN — ROCURONIUM BROMIDE 50 MG: 10 SOLUTION INTRAVENOUS at 07:09

## 2023-09-20 RX ADMIN — ACETAMINOPHEN 1000 MG: 500 TABLET ORAL at 06:09

## 2023-09-20 RX ADMIN — GABAPENTIN 300 MG: 300 CAPSULE ORAL at 06:09

## 2023-09-20 RX ADMIN — HYDROMORPHONE HYDROCHLORIDE 0.4 MG: 2 INJECTION INTRAMUSCULAR; INTRAVENOUS; SUBCUTANEOUS at 10:09

## 2023-09-20 RX ADMIN — CEFAZOLIN 1 G: 330 INJECTION, POWDER, FOR SOLUTION INTRAMUSCULAR; INTRAVENOUS at 07:09

## 2023-09-20 RX ADMIN — ONDANSETRON 4 MG: 2 INJECTION INTRAMUSCULAR; INTRAVENOUS at 08:09

## 2023-09-20 RX ADMIN — PHENYLEPHRINE HYDROCHLORIDE 200 MCG: 10 INJECTION INTRAVENOUS at 07:09

## 2023-09-20 RX ADMIN — KETOROLAC TROMETHAMINE 30 MG: 30 INJECTION, SOLUTION INTRAMUSCULAR; INTRAVENOUS at 09:09

## 2023-09-20 RX ADMIN — FENTANYL CITRATE 100 MCG: 50 INJECTION, SOLUTION INTRAMUSCULAR; INTRAVENOUS at 07:09

## 2023-09-20 RX ADMIN — MIDAZOLAM HYDROCHLORIDE 2 MG: 1 INJECTION, SOLUTION INTRAMUSCULAR; INTRAVENOUS at 07:09

## 2023-09-20 RX ADMIN — PHENYLEPHRINE HYDROCHLORIDE 100 MCG: 10 INJECTION INTRAVENOUS at 07:09

## 2023-09-20 RX ADMIN — SODIUM CHLORIDE, SODIUM GLUCONATE, SODIUM ACETATE, POTASSIUM CHLORIDE AND MAGNESIUM CHLORIDE: 526; 502; 368; 37; 30 INJECTION, SOLUTION INTRAVENOUS at 07:09

## 2023-09-20 RX ADMIN — SODIUM CHLORIDE, POTASSIUM CHLORIDE, SODIUM LACTATE AND CALCIUM CHLORIDE: 600; 310; 30; 20 INJECTION, SOLUTION INTRAVENOUS at 05:09

## 2023-09-20 NOTE — ANESTHESIA PROCEDURE NOTES
Intubation    Date/Time: 9/20/2023 7:14 AM    Performed by: Jaret Dasilva CRNA  Authorized by: Katelyn Hdz MD    Intubation:     Induction:  Intravenous    Intubated:  Postinduction    Mask Ventilation:  Easy with oral airway    Attempts:  1    Attempted By:  CRNA    Method of Intubation:  Direct    Blade:  Villatoro 2    Laryngeal View Grade: Grade IIA - cords partially seen      Difficult Airway Encountered?: No      Complications:  None    Airway Device:  Oral endotracheal tube    Airway Device Size:  7.0    Style/Cuff Inflation:  Cuffed (inflated to minimal occlusive pressure)    Inflation Amount (mL):  6    Tube secured:  21    Secured at:  The lips    Placement Verified By:  Capnometry    Complicating Factors:  None    Findings Post-Intubation:  BS equal bilateral

## 2023-09-20 NOTE — TRANSFER OF CARE
"Anesthesia Transfer of Care Note    Patient: Nuris Montgomery    Procedure(s) Performed: Procedure(s) (LRB):  XI ROBOTIC HYSTERECTOMY (N/A)    Patient location: PACU    Anesthesia Type: general    Transport from OR: Transported from OR on room air with adequate spontaneous ventilation    Post pain: adequate analgesia    Post assessment: no apparent anesthetic complications    Post vital signs: stable    Level of consciousness: responds to stimulation    Nausea/Vomiting: no nausea/vomiting    Complications: none    Transfer of care protocol was followed      Last vitals:   Visit Vitals  /75   Pulse 81   Temp 37 °C (98.6 °F) (Oral)   Resp 18   Ht 5' 4" (1.626 m)   Wt 68.3 kg (150 lb 9.2 oz)   LMP 09/11/2023 (Exact Date)   SpO2 100%   Breastfeeding No   BMI 25.85 kg/m²     "

## 2023-09-20 NOTE — NURSING
Nurses Note -- 4 Eyes      9/20/2023   5:29 PM      Skin assessed during: Admit      [x] No Altered Skin Integrity Present    []Prevention Measures Documented      [] Yes- Altered Skin Integrity Present or Discovered   [] LDA Added if Not in Epic (Describe Wound)   [] New Altered Skin Integrity was Present on Admit and Documented in LDA   [] Wound Image Taken    Wound Care Consulted? no    Attending Nurse:  Nel Huber RN    Second RN/Staff Member:   Vivi Díaz RN

## 2023-09-20 NOTE — OP NOTE
[facility]                       [address]                      [place] [address]    PATIENT NAME:      AMISH CARLSON  YOB: 1985  CSN:               [id number]  MRN:               [id number]  ADMIT DATE:        09/20/2023 04:47:00  PHYSICIAN:         Wilbur Will MD                          OPERATIVE REPORT      DATE OF SURGERY:        SURGEON:  Wilbur Will MD    ASSISTANT:  Ole Will MD    OPERATIONS PERFORMED:    1. Total laparoscopic hysterectomy.  2. Left salpingo-oophorectomy.    PREOPERATIVE DIAGNOSIS:  The patient is a 38-year-old white female with   symptomatic uterine fibroids.    POSTOPERATIVE DIAGNOSIS:  The patient is a 38-year-old white female with   symptomatic uterine fibroids.    PROCEDURE IN DETAIL:  After proper consents were obtained, the patient was   brought to the operating room, and placed in supine position.  Underwent general   anesthesia and intubation without difficulty and placed in dorsal lithotomy   position, prepped and draped in a sterile fashion.  The DOROTHEA manipulator was   placed into the cervical canal.  A small incision was made above the umbilicus   and a Veress needle was placed into the abdominal cavity.  2 L of CO2 was   allowed to enter the abdominal cavity.  The trocars were then introduced without   difficulty.  The da Patsy robotic system was then put into place and docked.    The contents of the pelvic cavity examined and uterus noted to be enlarged   secondary to uterine fibroids.  The left ovary and tube were noted to be adhered   to the pelvic sidewall and the left ovary had numerous cysts on the surface.   Due to these abnormal findings, decided to remove the left ovary.  At this point   time, the round ligament was identified bilaterally, clamped, cauterized, and   cut.  The right ovarian pedicle was then clamped, cauterized, and cut.  The left   ovary  and tube were dissected off the pelvic sidewall and there were also some   adhesions between the ovary and the colon.  These were taken down with sharp and   blunt dissection.  Once this was accomplished, the left infundibulopelvic   ligament was isolated, clamped, cauterized, and cut.  At this point time, the   bladder was mobilized below the cervix, thus exposing uterine arteries.  Uterine   arteries were then clamped, cauterized, and cut bilaterally.  An anterior and   posterior colpotomy incision was then made and the ring was demonstrated.  The   tissue at the 3 o'clock and 9 o'clock positions was then dissected down to the   ring, thus completing the colpotomy.  The specimen was removed transvaginally.    The vaginal cuff was closed with 2 sutures of Stratafix.  All pedicles were   checked and found to be absolutely dry.  There was no evidence of any visceral   injuries.  At this point time, the CO2 was allowed to escape from the abdominal   cavity.  Trocars removed without difficulty.  The small incisions closed with   3-0 Vicryl subcuticular stitch.  The patient was carefully brought out of dorsal   lithotomy position and brought to the recovery room in stable fashion,   tolerating procedure well.        ______________________________  Wilbur Will MD CEP/CATRACHO  DD:  09/20/2023  Time:  09:08AM  DT:  09/20/2023  Time:  11:04AM  Job #:  [id number]      OPERATIVE REPORT

## 2023-09-21 VITALS
HEIGHT: 64 IN | TEMPERATURE: 99 F | OXYGEN SATURATION: 98 % | BODY MASS INDEX: 25.7 KG/M2 | HEART RATE: 65 BPM | RESPIRATION RATE: 16 BRPM | SYSTOLIC BLOOD PRESSURE: 117 MMHG | WEIGHT: 150.56 LBS | DIASTOLIC BLOOD PRESSURE: 82 MMHG

## 2023-09-21 PROBLEM — D25.1 FIBROIDS, INTRAMURAL: Status: ACTIVE | Noted: 2023-09-21

## 2023-09-21 LAB
BASOPHILS # BLD AUTO: 0.03 X10(3)/MCL
BASOPHILS NFR BLD AUTO: 0.3 %
EOSINOPHIL # BLD AUTO: 0.07 X10(3)/MCL (ref 0–0.9)
EOSINOPHIL NFR BLD AUTO: 0.7 %
ERYTHROCYTE [DISTWIDTH] IN BLOOD BY AUTOMATED COUNT: 12.3 % (ref 11.5–17)
HCT VFR BLD AUTO: 34.5 % (ref 37–47)
HGB BLD-MCNC: 11.3 G/DL (ref 12–16)
IMM GRANULOCYTES # BLD AUTO: 0.03 X10(3)/MCL (ref 0–0.04)
IMM GRANULOCYTES NFR BLD AUTO: 0.3 %
LYMPHOCYTES # BLD AUTO: 2.99 X10(3)/MCL (ref 0.6–4.6)
LYMPHOCYTES NFR BLD AUTO: 31.9 %
MCH RBC QN AUTO: 29.5 PG (ref 27–31)
MCHC RBC AUTO-ENTMCNC: 32.8 G/DL (ref 33–36)
MCV RBC AUTO: 90.1 FL (ref 80–94)
MONOCYTES # BLD AUTO: 0.75 X10(3)/MCL (ref 0.1–1.3)
MONOCYTES NFR BLD AUTO: 8 %
NEUTROPHILS # BLD AUTO: 5.51 X10(3)/MCL (ref 2.1–9.2)
NEUTROPHILS NFR BLD AUTO: 58.8 %
NRBC BLD AUTO-RTO: 0 %
PLATELET # BLD AUTO: 208 X10(3)/MCL (ref 130–400)
PMV BLD AUTO: 9.7 FL (ref 7.4–10.4)
RBC # BLD AUTO: 3.83 X10(6)/MCL (ref 4.2–5.4)
WBC # SPEC AUTO: 9.38 X10(3)/MCL (ref 4.5–11.5)

## 2023-09-21 PROCEDURE — 96376 TX/PRO/DX INJ SAME DRUG ADON: CPT

## 2023-09-21 PROCEDURE — 63600175 PHARM REV CODE 636 W HCPCS: Performed by: OBSTETRICS & GYNECOLOGY

## 2023-09-21 PROCEDURE — 85025 COMPLETE CBC W/AUTO DIFF WBC: CPT | Performed by: OBSTETRICS & GYNECOLOGY

## 2023-09-21 PROCEDURE — 25000003 PHARM REV CODE 250: Performed by: ANESTHESIOLOGY

## 2023-09-21 PROCEDURE — G0378 HOSPITAL OBSERVATION PER HR: HCPCS

## 2023-09-21 RX ADMIN — HYDROCODONE BITARTRATE AND ACETAMINOPHEN 1 TABLET: 5; 325 TABLET ORAL at 11:09

## 2023-09-21 RX ADMIN — KETOROLAC TROMETHAMINE 30 MG: 30 INJECTION, SOLUTION INTRAMUSCULAR; INTRAVENOUS at 02:09

## 2023-09-21 NOTE — DISCHARGE SUMMARY
[facility] - 8th Floor Med Surg  Obstetrics & Gynecology  Discharge Summary    Patient Name: Nuris Montgomery  MRN: [id number]  Admission Date: 9/20/2023  Hospital Length of Stay: 0 days  Discharge Date and Time:  09/21/2023 12:11 PM  Attending Physician: Wilbur Will   Discharging Provider: Wilbur Will MD  Primary Care Provider: Farideh Villatoro NP    HPI:  No notes on file    Hospital Course:  No notes on file    Goals of Care Treatment Preferences:         Procedure(s) (LRB):  XI ROBOTIC HYSTERECTOMY (N/A)         Significant Diagnostic Studies: N/A      Pending Diagnostic Studies:     Procedure Component Value Units Date/Time    Specimen to Pathology [[id number]] Collected: 09/20/23 0924    Order Status: Sent Lab Status: In process Updated: 09/21/23 1118    Specimen: Tissue from Uterus; Tissue from Cervix; Tissue from Ovary, Left; Tissue from Bilateral Fallopian Tubes         Final Active Diagnoses:    Diagnosis Date Noted POA    PRINCIPAL PROBLEM:  Fibroids, intramural [D25.1] 09/21/2023 Yes      Problems Resolved During this Admission:        Discharged Condition: No complaints, VSSAF, ambulating without problems, tolerating diet, voiding well, ready to go home    Disposition:     Follow Up:2 weeks    Patient Instructions:   No discharge procedures on file.  Medications:  Reconciled Home Medications:      Medication List      ASK your doctor about these medications    ALPRAZolam 0.25 MG tablet  Commonly known as: XANAX  Take 1 tablet (0.25 mg total) by mouth 3 (three) times daily as needed for Anxiety. as needed for anxiety.     dicyclomine 20 mg tablet  Commonly known as: BENTYL  Take 20 mg by mouth 4 (four) times daily as needed.     EScitalopram oxalate 20 MG tablet  Commonly known as: LEXAPRO  TAKE 1 TABLET BY MOUTH DAILY     metFORMIN 500 MG ER 24hr tablet  Commonly known as: GLUCOPHAGE-XR  TAKE ONE(1) TAB BY MOUTH TWICE DAILY WITH BREAKFAST & SUPPER FOR SUGAR /DO NOT CRUSH OR  CHEW     * MOUNJARO 15 mg/0.5 mL Pnij  Generic drug: tirzepatide  SMARTSIG:15 Milligram(s) SUB-Q Once a Week     * tirzepatide 12.5 mg/0.5 mL Pnij  Inject 12.5 mg into the skin every 7 days.     rosuvastatin 10 MG tablet  Commonly known as: CRESTOR  TAKE ONE(1) TAB BY MOUTH ONCE A DAY IN THE EVENING FOR CHOLESTEROL     tranexamic acid 650 mg tablet  Commonly known as: LYSTEDA  Take 650 mg by mouth 3 (three) times daily.     traZODone 50 MG tablet  Commonly known as: DESYREL  TAKE ONE(1) TAB BY MOUTH EVERY NIGHT AT BEDTIME WITH FOOD FOR SLEEP &/OR DEPRESSION     VRAYLAR 1.5 mg Cap  Generic drug: cariprazine  Take 1 capsule (1.5 mg total) by mouth Daily.         * This list has 2 medication(s) that are the same as other medications prescribed for you. Read the directions carefully, and ask your doctor or other care provider to review them with you.                Wilbur Will MD  Obstetrics & Gynecology  [facility] - 8th Floor Med Surg

## 2023-09-21 NOTE — PLAN OF CARE
Being discharged home, no signs of infection, rx in outpatient pharmacy waiting for delivery, voiding without difficulty

## 2023-09-21 NOTE — ANESTHESIA POSTPROCEDURE EVALUATION
Anesthesia Post Evaluation    Patient: Nuris Montgomery    Procedure(s) Performed: Procedure(s) (LRB):  XI ROBOTIC HYSTERECTOMY (N/A)    Final Anesthesia Type: general      Patient location during evaluation: PACU  Patient participation: Yes- Able to Participate  Level of consciousness: awake and alert  Post-procedure vital signs: reviewed and stable  Pain management: adequate  Airway patency: patent    PONV status at discharge: No PONV  Anesthetic complications: no      Cardiovascular status: hemodynamically stable  Respiratory status: unassisted  Hydration status: euvolemic  Follow-up not needed.          Vitals Value Taken Time   BP 95/61 09/20/23 1201   Temp 36.3 °C (97.3 °F) 09/20/23 0930   Pulse 83 09/20/23 1226   Resp 20 09/20/23 1226   SpO2 95 % 09/20/23 1226   Vitals shown include unvalidated device data.      Event Time   Out of Recovery 17:00:00         Pain/Calixto Score: Pain Rating Prior to Med Admin: 6 (9/21/2023  2:09 AM)  Pain Rating Post Med Admin: 0 (9/21/2023  2:39 AM)  Calixto Score: 10 (9/20/2023 11:30 AM)

## 2023-09-21 NOTE — NURSING
Discharge instructions reviewed verbally and printed, signs and symptoms to notify  of, infection, dvt, vaginal bleeding, reviewed rx and stressed no alcohol while taking and no driving until dr estrella, no lifting over 10# and no swimming or tub baths, shower only verbalizes understanding,  1538  Discharged to private vehicle via w/c, awake alert x 4, resp even & unlabored, steri strips dry and in place on lap site, no redness, swelling or drainage noted.  Rx delivered to room by outpatient pharmacy, condition good

## 2023-09-22 LAB — PSYCHE PATHOLOGY RESULT: NORMAL

## 2023-09-25 ENCOUNTER — TELEPHONE (OUTPATIENT)
Dept: FAMILY MEDICINE | Facility: CLINIC | Age: 38
End: 2023-09-25
Payer: COMMERCIAL

## 2023-09-25 DIAGNOSIS — E11.9 TYPE 2 DIABETES MELLITUS WITHOUT COMPLICATION, WITHOUT LONG-TERM CURRENT USE OF INSULIN: ICD-10-CM

## 2023-09-25 NOTE — TELEPHONE ENCOUNTER
----- Message from Kaylene Villatoro sent at 9/25/2023  9:31 AM CDT -----  Regarding: Med refill      Needs a refill on her Munjaro 12.[address]        [address]

## 2023-10-10 ENCOUNTER — TELEPHONE (OUTPATIENT)
Dept: FAMILY MEDICINE | Facility: CLINIC | Age: 38
End: 2023-10-10
Payer: COMMERCIAL

## 2023-10-10 DIAGNOSIS — F41.9 ANXIETY AND DEPRESSION: ICD-10-CM

## 2023-10-10 DIAGNOSIS — F32.A ANXIETY AND DEPRESSION: ICD-10-CM

## 2023-10-10 RX ORDER — CARIPRAZINE 1.5 MG/1
1.5 CAPSULE, GELATIN COATED ORAL DAILY
Qty: 30 CAPSULE | Refills: 2 | COMMUNITY
Start: 2023-10-10 | End: 2024-02-22 | Stop reason: ALTCHOICE

## 2023-10-10 NOTE — TELEPHONE ENCOUNTER
----- Message from Magda Norman sent at 10/10/2023  1:48 PM CDT -----  Patient needs refill on Vraylar 1.5 called  in to [facility] in [place]

## 2023-10-11 ENCOUNTER — TELEPHONE (OUTPATIENT)
Dept: FAMILY MEDICINE | Facility: CLINIC | Age: 38
End: 2023-10-11
Payer: COMMERCIAL

## 2023-10-11 NOTE — TELEPHONE ENCOUNTER
"----- Message from Kaylene Villatoro sent at 10/11/2023  2:53 PM CDT -----  Regarding: Med issue    She has been taking Vraylar for about a week and a half and it is helping but it is also giving her the "nervous shakes" like the previous medication she tried.    Please call her back to let her know what she can do.    [phone]    "

## 2023-10-18 ENCOUNTER — TELEPHONE (OUTPATIENT)
Dept: FAMILY MEDICINE | Facility: CLINIC | Age: 38
End: 2023-10-18
Payer: COMMERCIAL

## 2023-10-18 DIAGNOSIS — E11.9 TYPE 2 DIABETES MELLITUS WITHOUT COMPLICATION, WITHOUT LONG-TERM CURRENT USE OF INSULIN: ICD-10-CM

## 2023-10-18 RX ORDER — METFORMIN HYDROCHLORIDE 500 MG/1
TABLET, EXTENDED RELEASE ORAL
Qty: 60 TABLET | Refills: 2 | Status: SHIPPED | OUTPATIENT
Start: 2023-10-18 | End: 2024-01-02

## 2023-10-18 NOTE — TELEPHONE ENCOUNTER
----- Message from Magda Norman sent at 10/18/2023  2:56 PM CDT -----  Patient needs refill on Metformin  mg called in to [facility] in [place]

## 2023-11-16 DIAGNOSIS — G47.00 INSOMNIA, UNSPECIFIED TYPE: ICD-10-CM

## 2023-11-16 RX ORDER — TRAZODONE HYDROCHLORIDE 50 MG/1
TABLET ORAL
Qty: 30 TABLET | Refills: 2 | Status: SHIPPED | OUTPATIENT
Start: 2023-11-16 | End: 2024-02-14

## 2023-11-20 ENCOUNTER — TELEPHONE (OUTPATIENT)
Dept: FAMILY MEDICINE | Facility: CLINIC | Age: 38
End: 2023-11-20
Payer: COMMERCIAL

## 2023-11-20 DIAGNOSIS — E11.9 TYPE 2 DIABETES MELLITUS WITHOUT COMPLICATION, WITHOUT LONG-TERM CURRENT USE OF INSULIN: ICD-10-CM

## 2023-11-20 NOTE — TELEPHONE ENCOUNTER
----- Message from Kaylene Villatoro sent at 11/20/2023  8:46 AM CST -----  Regarding: Med refill  She needs a refill on her Munro [address]    [address]

## 2023-12-18 ENCOUNTER — OFFICE VISIT (OUTPATIENT)
Dept: URGENT CARE | Facility: CLINIC | Age: 38
End: 2023-12-18
Payer: COMMERCIAL

## 2023-12-18 VITALS
BODY MASS INDEX: 24.41 KG/M2 | SYSTOLIC BLOOD PRESSURE: 123 MMHG | TEMPERATURE: 99 F | RESPIRATION RATE: 18 BRPM | HEART RATE: 101 BPM | DIASTOLIC BLOOD PRESSURE: 83 MMHG | HEIGHT: 64 IN | OXYGEN SATURATION: 98 % | WEIGHT: 143 LBS

## 2023-12-18 DIAGNOSIS — R52 BODY ACHES: ICD-10-CM

## 2023-12-18 DIAGNOSIS — R68.89 FLU-LIKE SYMPTOMS: Primary | ICD-10-CM

## 2023-12-18 LAB
CTP QC/QA: YES
POC MOLECULAR INFLUENZA A AGN: NEGATIVE
POC MOLECULAR INFLUENZA B AGN: NEGATIVE

## 2023-12-18 PROCEDURE — 99213 PR OFFICE/OUTPT VISIT, EST, LEVL III, 20-29 MIN: ICD-10-PCS | Mod: ,,,

## 2023-12-18 PROCEDURE — 99213 OFFICE O/P EST LOW 20 MIN: CPT | Mod: ,,,

## 2023-12-18 PROCEDURE — 87502 POCT INFLUENZA A/B MOLECULAR: ICD-10-PCS | Mod: QW,,,

## 2023-12-18 PROCEDURE — 87502 INFLUENZA DNA AMP PROBE: CPT | Mod: QW,,,

## 2023-12-18 RX ORDER — PREDNISONE 20 MG/1
20 TABLET ORAL DAILY
Qty: 5 TABLET | Refills: 0 | Status: SHIPPED | OUTPATIENT
Start: 2023-12-18 | End: 2023-12-23

## 2023-12-18 RX ORDER — METFORMIN HYDROCHLORIDE 500 MG/1
500 TABLET, EXTENDED RELEASE ORAL 2 TIMES DAILY WITH MEALS
COMMUNITY
End: 2024-03-27 | Stop reason: SDUPTHER

## 2023-12-18 RX ORDER — HYDROCODONE BITARTRATE AND ACETAMINOPHEN 7.5; 325 MG/1; MG/1
1 TABLET ORAL EVERY 6 HOURS PRN
COMMUNITY
Start: 2023-09-21 | End: 2024-02-22 | Stop reason: ALTCHOICE

## 2023-12-18 NOTE — PATIENT INSTRUCTIONS
Flu negative.  It may have been too early to test as symptoms just started.  It is recommended that you return for a nurse visit in 1-2 days and get re-swabbed if symptoms continue.      Take OTC cough/cold/congestion medication such as Dayquil/Nyquil.  May also take antihistamine such as Claritin/Zyrtec/Allegra.  Cepacol sore throat lozenges if needed.     Drink plenty of fluids.  Rest.      Prednisone- to help with congestion/inflammation- take as prescribed- take with food.

## 2023-12-18 NOTE — PROGRESS NOTES
"Subjective:      Patient ID: Nuris Montgomery is a 38 y.o. female.    Vitals:  height is 5' 4" (1.626 m) and weight is 64.9 kg (143 lb). Her oral temperature is 98.5 °F (36.9 °C). Her blood pressure is 123/83 and her pulse is 101. Her respiration is 18 and oxygen saturation is 98%.     Chief Complaint: Sore Throat (Sore throat (pt states post nasal drip), nasal congestion, body aches & HA, sinus pressure & fatigue. Left ear pain & muffled feeling.  Symptoms x1 day. Has taken nyquil last night. Declined covid POCT.)    Patient is a 38-year-old female that presents complaining of sore throat, postnasal drip, nasal congestion, body aches, headache, sinus pressure, fatigue and left ear pains starting yesterday. Patient denies any SOB, CP, rash, n/v/d, or neck stiffness.      Patient only agrees to do flu testing.    Sore Throat   Associated symptoms include congestion, ear pain and headaches.       Constitution: Positive for fatigue.   HENT:  Positive for ear pain, congestion, sinus pressure and sore throat.    Neurological:  Positive for headaches.      Objective:     Physical Exam   Constitutional: She is oriented to person, place, and time.  Non-toxic appearance. She does not appear ill.   HENT:   Ears:   Right Ear: Tympanic membrane, external ear and ear canal normal.   Left Ear: Tympanic membrane, external ear and ear canal normal.   Nose: Congestion present.   Mouth/Throat: Mucous membranes are moist. Posterior oropharyngeal erythema present. No tonsillar exudate. Oropharynx is clear.      Comments: Clear pnd noted  Clear fluid in bilateral ears      Comments: Clear fluid in bilateral ears  Eyes: Conjunctivae are normal.   Cardiovascular: Normal rate and normal pulses.   Pulmonary/Chest: Effort normal and breath sounds normal.   Abdominal: Normal appearance and bowel sounds are normal. Soft. There is no abdominal tenderness.   Musculoskeletal: Normal range of motion.         General: Normal range of motion. " "  Neurological: She is alert and oriented to person, place, and time.   Skin: Skin is warm, dry and no rash. Capillary refill takes less than 2 seconds.   Psychiatric: Her behavior is normal. Mood normal.       Assessment:     1. Flu-like symptoms    2. Body aches           Previous History      Review of patient's allergies indicates:   Allergen Reactions    Adhesive     Percocet [oxycodone-acetaminophen] Hives    Sulfa (sulfonamide antibiotics)        Past Medical History:   Diagnosis Date    Adenomyosis     Anxiety disorder, unspecified     Chronic cholecystitis     Depression     Diabetes mellitus     Fatty liver     Fibroids, intramural     GERD (gastroesophageal reflux disease)     Heavy menstrual bleeding     History of kidney stones     HLD (hyperlipidemia)     Irregular menstruation     PATRIZIA on CPAP     Pelvic pain     Respiratory distress     "oxygen drops c anesthesia"     Current Outpatient Medications   Medication Instructions    ALPRAZolam (XANAX) 0.25 mg, Oral, 3 times daily PRN, as needed for anxiety.    dicyclomine (BENTYL) 20 mg, Oral, 4 times daily PRN    EScitalopram oxalate (LEXAPRO) 20 mg, Oral    HYDROcodone-acetaminophen (NORCO) 7.5-325 mg per tablet 1 tablet, Oral, Every 6 hours PRN    metFORMIN (GLUCOPHAGE-XR) 500 MG ER 24hr tablet TAKE ONE(1) TAB BY MOUTH TWICE DAILY WITH BREAKFAST & SUPPER FOR SUGAR /DO NOT CRUSH OR CHEW    metFORMIN (GLUCOPHAGE-XR) 500 mg, Oral, 2 times daily with meals    predniSONE (DELTASONE) 20 mg, Oral, Daily    rosuvastatin (CRESTOR) 10 MG tablet TAKE ONE(1) TAB BY MOUTH ONCE A DAY IN THE EVENING FOR CHOLESTEROL    tirzepatide 12.5 mg, Subcutaneous, Every 7 days    tranexamic acid (LYSTEDA) 650 mg, Oral, 3 times daily    traZODone (DESYREL) 50 MG tablet TAKE 1 TABLET BY MOUTH EVERY NIGHT AT BEDTIME WITH FOOD FOR SLEEP AND/OR DEPRESSION    VRAYLAR 1.5 mg, Oral, Daily     Past Surgical History:   Procedure Laterality Date    DENTAL SURGERY      LAPAROSCOPIC " "CHOLECYSTECTOMY N/A 10/14/2022    Procedure: CHOLECYSTECTOMY, LAPAROSCOPIC;  Surgeon: Dalton Boateng MD;  Location: [facility] OR;  Service: General;  Laterality: N/A;    LITHOTRIPSY      REPLACEMENT OF NEPHROSTOMY TUBE      ROBOT-ASSISTED LAPAROSCOPIC ABDOMINAL HYSTERECTOMY USING DA AMINAH XI N/A 9/20/2023    Procedure: XI ROBOTIC HYSTERECTOMY;  Surgeon: Wilbur Will MD;  Location: [facility] OR;  Service: OB/GYN;  Laterality: N/A;  RALH / POSS BSO    URETERAL STENT PLACEMENT       Family History   Family history unknown: Yes       Social History     Tobacco Use    Smoking status: Never     Passive exposure: Never    Smokeless tobacco: Never   Substance Use Topics    Alcohol use: Not Currently    Drug use: Never        Physical Exam      Vital Signs Reviewed   /83   Pulse 101   Temp 98.5 °F (36.9 °C) (Oral)   Resp 18   Ht 5' 4" (1.626 m)   Wt 64.9 kg (143 lb)   SpO2 98%   BMI 24.55 kg/m²        Procedures    Procedures     Labs     Results for orders placed or performed in visit on 12/18/23   POCT Influenza A/B Molecular   Result Value Ref Range    POC Molecular Influenza A Ag Negative Negative, Not Reported    POC Molecular Influenza B Ag Negative Negative, Not Reported     Acceptable Yes       Plan:       Flu-like symptoms  -     predniSONE (DELTASONE) 20 MG tablet; Take 1 tablet (20 mg total) by mouth once daily. for 5 days  Dispense: 5 tablet; Refill: 0    Body aches  -     POCT Influenza A/B Molecular      Flu negative.  It may have been too early to test as symptoms just started.  It is recommended that you return for a nurse visit in 1-2 days and get re-swabbed if symptoms continue.      Take OTC cough/cold/congestion medication such as Dayquil/Nyquil.  May also take antihistamine such as Claritin/Zyrtec/Allegra.  Cepacol sore throat lozenges if needed.     Drink plenty of fluids.  Rest.      Prednisone- to help with congestion/inflammation- take as prescribed- take with food.       "

## 2023-12-19 DIAGNOSIS — F32.A ANXIETY AND DEPRESSION: ICD-10-CM

## 2023-12-19 DIAGNOSIS — F41.9 ANXIETY AND DEPRESSION: ICD-10-CM

## 2023-12-19 RX ORDER — ESCITALOPRAM OXALATE 20 MG/1
20 TABLET ORAL
Qty: 30 TABLET | Refills: 2 | Status: SHIPPED | OUTPATIENT
Start: 2023-12-19 | End: 2024-03-21 | Stop reason: SDUPTHER

## 2023-12-20 ENCOUNTER — CLINICAL SUPPORT (OUTPATIENT)
Dept: URGENT CARE | Facility: CLINIC | Age: 38
End: 2023-12-20
Payer: COMMERCIAL

## 2023-12-20 DIAGNOSIS — R05.9 COUGH, UNSPECIFIED TYPE: Primary | ICD-10-CM

## 2023-12-20 LAB
CTP QC/QA: YES
POC MOLECULAR INFLUENZA A AGN: NEGATIVE
POC MOLECULAR INFLUENZA B AGN: NEGATIVE

## 2023-12-20 PROCEDURE — 87502 INFLUENZA DNA AMP PROBE: CPT | Mod: QW,,,

## 2023-12-20 PROCEDURE — 87502 POCT INFLUENZA A/B MOLECULAR: ICD-10-PCS | Mod: QW,,,

## 2023-12-20 NOTE — PROGRESS NOTES
Pt presents to clinic for reswab for flu. Pt result is negative for flu. Pt notified of results and voiced thanks and understanding with no further questions.

## 2024-01-01 DIAGNOSIS — E11.9 TYPE 2 DIABETES MELLITUS WITHOUT COMPLICATION, WITHOUT LONG-TERM CURRENT USE OF INSULIN: ICD-10-CM

## 2024-01-02 RX ORDER — METFORMIN HYDROCHLORIDE 500 MG/1
TABLET, EXTENDED RELEASE ORAL
Qty: 60 TABLET | Refills: 2 | Status: SHIPPED | OUTPATIENT
Start: 2024-01-02 | End: 2024-02-22 | Stop reason: SDUPTHER

## 2024-01-17 ENCOUNTER — TELEPHONE (OUTPATIENT)
Dept: FAMILY MEDICINE | Facility: CLINIC | Age: 39
End: 2024-01-17
Payer: COMMERCIAL

## 2024-01-17 DIAGNOSIS — E11.9 TYPE 2 DIABETES MELLITUS WITHOUT COMPLICATION, WITHOUT LONG-TERM CURRENT USE OF INSULIN: Primary | ICD-10-CM

## 2024-01-17 RX ORDER — TIRZEPATIDE 12.5 MG/.5ML
12.5 INJECTION, SOLUTION SUBCUTANEOUS
COMMUNITY
End: 2024-01-17 | Stop reason: SDUPTHER

## 2024-01-17 RX ORDER — TIRZEPATIDE 12.5 MG/.5ML
12.5 INJECTION, SOLUTION SUBCUTANEOUS
Qty: 4 PEN | Refills: 2 | Status: SHIPPED | OUTPATIENT
Start: 2024-01-17 | End: 2024-04-16

## 2024-01-17 NOTE — TELEPHONE ENCOUNTER
----- Message from Magda Norman sent at 1/17/2024 11:10 AM CST -----  Patient needs refill on Mon[place]uro 12.5 called in to [address]

## 2024-02-14 DIAGNOSIS — G47.00 INSOMNIA, UNSPECIFIED TYPE: ICD-10-CM

## 2024-02-14 RX ORDER — TRAZODONE HYDROCHLORIDE 50 MG/1
TABLET ORAL
Qty: 30 TABLET | Refills: 2 | Status: SHIPPED | OUTPATIENT
Start: 2024-02-14 | End: 2024-05-20

## 2024-02-21 NOTE — PROGRESS NOTES
"SUBJECTIVE:  Nuris Montgomery is a 38 y.o. female here alone for ADHD.    HPI  Patient presents to discuss starting Strattera. Never been diagnosed with ADHD. Taken Vyvanse for binge eating disorder but made patient angry. Due for diabetes urine screening. Urine screening ordered. Due for eye exam. Will schedule eye exam within next few weeks. Due for A1C. On Trazodone 50 mg, taking 2 tablets night to sleep. Need Lexapro refills. ADHD questionnaire filled out and positive.  Pateint would like to try Straterra.      Nuris's allergies, medications, history, and problem list were updated as appropriate.    Review of Systems   Constitutional:  Negative for activity change and unexpected weight change.   HENT:  Negative for hearing loss, rhinorrhea and trouble swallowing.    Eyes:  Negative for discharge and visual disturbance.   Respiratory:  Negative for chest tightness and wheezing.    Cardiovascular:  Negative for chest pain and palpitations.   Gastrointestinal:  Negative for blood in stool, constipation, diarrhea and vomiting.   Endocrine: Negative for polydipsia and polyuria.   Genitourinary:  Negative for difficulty urinating, dysuria, hematuria and menstrual problem.   Musculoskeletal:  Negative for arthralgias, joint swelling and neck pain.   Neurological:  Negative for weakness and headaches.   Psychiatric/Behavioral:  Negative for confusion and dysphoric mood.       A comprehensive review of symptoms was completed and negative except as noted above.    OBJECTIVE:  Vital signs  Vitals:    02/22/24 1255   BP: 112/66   BP Location: Left arm   Patient Position: Sitting   Pulse: 94   Resp: 18   Temp: 97.1 °F (36.2 °C)   TempSrc: Temporal   SpO2: 100%   Weight: 67.6 kg (149 lb)   Height: 5' 4" (1.626 m)        Physical Exam  Constitutional:       Appearance: Normal appearance.   HENT:      Head: Normocephalic and atraumatic.      Nose: Nose normal.      Mouth/Throat:      Mouth: Mucous membranes are moist.      " Pharynx: Oropharynx is clear.   Eyes:      Conjunctiva/sclera: Conjunctivae normal.   Cardiovascular:      Rate and Rhythm: Normal rate and regular rhythm.   Pulmonary:      Effort: Pulmonary effort is normal.      Breath sounds: Normal breath sounds.   Abdominal:      General: Bowel sounds are normal.      Palpations: Abdomen is soft.   Skin:     General: Skin is warm.      Capillary Refill: Capillary refill takes less than 2 seconds.   Neurological:      Mental Status: She is alert.   Psychiatric:         Mood and Affect: Mood is anxious.         Behavior: Behavior normal.         Judgment: Judgment normal.          No visits with results within 1 Day(s) from this visit.   Latest known visit with results is:   Clinical Support on 12/20/2023   Component Date Value Ref Range Status    POC Molecular Influenza A Ag 12/20/2023 Negative  Negative, Not Reported Final    POC Molecular Influenza B Ag 12/20/2023 Negative  Negative, Not Reported Final     Acceptable 12/20/2023 Yes   Final          ASSESSMENT/PLAN:    1. Type 2 diabetes mellitus without complication, without long-term current use of insulin  Comments:  continue Monjouro  Orders:  -     Microalbumin/Creatinine Ratio, Urine    2. Anxiety and depression  Comments:  still a lot of family stress    3. ADHD (attention deficit hyperactivity disorder), inattentive type  Comments:  p[ositive screening wants to try non stimulant since she had issues with Vyvanse in the past  Orders:  -     atomoxetine (STRATTERA) 10 MG capsule; Take 1 capsule (10 mg total) by mouth once daily.  Dispense: 30 capsule; Refill: 0          No results found for this or any previous visit (from the past 24 hour(s)).    Follow Up:  Follow up in about 1 month (around 3/22/2024).      Discussed the diagnosis, prognosis, plan of care, chronic nature of and indications for, risks and benefits of treatment for conditions.  Continue all medications as prescribed unless otherwise  noted.   Call if patient develops other symptoms or if not better in 2-3 days and sooner if worse. Emphasized the importance of compliance with all recommendations, including medication use and timely follow up. Instructed to return as noted be or sooner if patient develops any other problems or if there are any other additional questions or concerns.

## 2024-02-22 ENCOUNTER — OFFICE VISIT (OUTPATIENT)
Dept: FAMILY MEDICINE | Facility: CLINIC | Age: 39
End: 2024-02-22
Payer: COMMERCIAL

## 2024-02-22 VITALS
RESPIRATION RATE: 18 BRPM | DIASTOLIC BLOOD PRESSURE: 66 MMHG | BODY MASS INDEX: 25.44 KG/M2 | HEIGHT: 64 IN | OXYGEN SATURATION: 100 % | SYSTOLIC BLOOD PRESSURE: 112 MMHG | TEMPERATURE: 97 F | HEART RATE: 94 BPM | WEIGHT: 149 LBS

## 2024-02-22 DIAGNOSIS — F41.9 ANXIETY AND DEPRESSION: ICD-10-CM

## 2024-02-22 DIAGNOSIS — F90.0 ADHD (ATTENTION DEFICIT HYPERACTIVITY DISORDER), INATTENTIVE TYPE: ICD-10-CM

## 2024-02-22 DIAGNOSIS — E11.9 TYPE 2 DIABETES MELLITUS WITHOUT COMPLICATION, WITHOUT LONG-TERM CURRENT USE OF INSULIN: Primary | ICD-10-CM

## 2024-02-22 DIAGNOSIS — F32.A ANXIETY AND DEPRESSION: ICD-10-CM

## 2024-02-22 PROCEDURE — 3074F SYST BP LT 130 MM HG: CPT | Mod: CPTII,,, | Performed by: NURSE PRACTITIONER

## 2024-02-22 PROCEDURE — 3078F DIAST BP <80 MM HG: CPT | Mod: CPTII,,, | Performed by: NURSE PRACTITIONER

## 2024-02-22 PROCEDURE — 99214 OFFICE O/P EST MOD 30 MIN: CPT | Mod: ,,, | Performed by: NURSE PRACTITIONER

## 2024-02-22 PROCEDURE — 3008F BODY MASS INDEX DOCD: CPT | Mod: CPTII,,, | Performed by: NURSE PRACTITIONER

## 2024-02-22 PROCEDURE — 1159F MED LIST DOCD IN RCRD: CPT | Mod: CPTII,,, | Performed by: NURSE PRACTITIONER

## 2024-02-22 RX ORDER — ATOMOXETINE 10 MG/1
10 CAPSULE ORAL DAILY
Qty: 30 CAPSULE | Refills: 0 | Status: SHIPPED | OUTPATIENT
Start: 2024-02-22 | End: 2024-03-21

## 2024-02-23 LAB
ALBUMIN/CREAT UR: 6 MG/G CREAT (ref 0–29)
CREAT UR-MCNC: 268 MG/DL
MICROALBUMIN UR-MCNC: 15.8 UG/ML

## 2024-03-05 LAB
LEFT EYE DM RETINOPATHY: NEGATIVE
RIGHT EYE DM RETINOPATHY: NEGATIVE

## 2024-03-06 ENCOUNTER — DOCUMENTATION ONLY (OUTPATIENT)
Dept: FAMILY MEDICINE | Facility: CLINIC | Age: 39
End: 2024-03-06
Payer: COMMERCIAL

## 2024-03-12 DIAGNOSIS — F41.1 GAD (GENERALIZED ANXIETY DISORDER): ICD-10-CM

## 2024-03-13 ENCOUNTER — TELEPHONE (OUTPATIENT)
Dept: FAMILY MEDICINE | Facility: CLINIC | Age: 39
End: 2024-03-13
Payer: COMMERCIAL

## 2024-03-13 DIAGNOSIS — K21.9 GASTROESOPHAGEAL REFLUX DISEASE WITHOUT ESOPHAGITIS: Primary | ICD-10-CM

## 2024-03-13 RX ORDER — ALPRAZOLAM 0.25 MG/1
TABLET ORAL
Qty: 75 TABLET | Refills: 1 | Status: SHIPPED | OUTPATIENT
Start: 2024-03-13

## 2024-03-13 RX ORDER — OMEPRAZOLE 40 MG/1
40 CAPSULE, DELAYED RELEASE ORAL DAILY
Qty: 30 CAPSULE | Refills: 11 | Status: SHIPPED | OUTPATIENT
Start: 2024-03-13 | End: 2025-03-13

## 2024-03-13 NOTE — TELEPHONE ENCOUNTER
----- Message from Magda Norman sent at 3/13/2024 10:26 AM CDT -----  Patient needs refill on Omeprazole 40 mg and Alprazolam .25 called in to [facility] in [place]

## 2024-03-20 ENCOUNTER — TELEPHONE (OUTPATIENT)
Dept: FAMILY MEDICINE | Facility: CLINIC | Age: 39
End: 2024-03-20
Payer: COMMERCIAL

## 2024-03-20 DIAGNOSIS — E11.9 TYPE 2 DIABETES MELLITUS WITHOUT COMPLICATION, WITHOUT LONG-TERM CURRENT USE OF INSULIN: Primary | ICD-10-CM

## 2024-03-20 NOTE — PROGRESS NOTES
"SUBJECTIVE:  Nuris Montgomery is a 38 y.o. female here alone for diabetes, ADHD.    HPI  Patient presents for 1 month follow up on ADHD and diabetes with fasting labs. Stopped statin due to myalgia. Will try OTC supplement to lower cholesterol. Discuss increasing ADHD medication. Not having side effects but still need focus. Patient is fasting for labs. Due for A1c. Needs Lexapro refill.    Nuris's allergies, medications, history, and problem list were updated as appropriate.    Review of Systems   Constitutional:  Negative for activity change and unexpected weight change.   HENT:  Negative for hearing loss, rhinorrhea and trouble swallowing.    Eyes:  Negative for discharge and visual disturbance.   Respiratory:  Negative for chest tightness and wheezing.    Cardiovascular:  Negative for chest pain and palpitations.   Gastrointestinal:  Negative for blood in stool, constipation, diarrhea and vomiting.   Endocrine: Negative for polydipsia and polyuria.   Genitourinary:  Negative for difficulty urinating, dysuria, hematuria and menstrual problem.   Musculoskeletal:  Negative for arthralgias, joint swelling and neck pain.   Neurological:  Negative for weakness and headaches.   Psychiatric/Behavioral:  Negative for confusion and dysphoric mood.       A comprehensive review of symptoms was completed and negative except as noted above.    OBJECTIVE:  Vital signs  Vitals:    03/21/24 0822   BP: 116/68   BP Location: Right arm   Patient Position: Sitting   Pulse: 82   Resp: 18   Temp: 96.9 °F (36.1 °C)   TempSrc: Temporal   SpO2: 96%   Weight: 67.4 kg (148 lb 9.6 oz)   Height: 5' 4" (1.626 m)        Physical Exam  Constitutional:       Appearance: Normal appearance.   HENT:      Head: Normocephalic and atraumatic.      Nose: Nose normal.      Mouth/Throat:      Mouth: Mucous membranes are moist.      Pharynx: Oropharynx is clear.   Eyes:      Conjunctiva/sclera: Conjunctivae normal.   Cardiovascular:      Rate and " Rhythm: Normal rate and regular rhythm.   Pulmonary:      Effort: Pulmonary effort is normal.      Breath sounds: Normal breath sounds.   Abdominal:      General: Bowel sounds are normal.      Palpations: Abdomen is soft.   Musculoskeletal:         General: Normal range of motion.      Cervical back: Normal range of motion and neck supple.   Skin:     General: Skin is warm.      Capillary Refill: Capillary refill takes less than 2 seconds.   Neurological:      Mental Status: She is alert.   Psychiatric:         Mood and Affect: Mood is anxious.         Behavior: Behavior normal.         Judgment: Judgment normal.        ASSESSMENT/PLAN:    1. Type 2 diabetes mellitus without complication, without long-term current use of insulin  -     CBC Auto Differential  -     Comprehensive Metabolic Panel  -     Hemoglobin A1C ([facility] ONLY)    2. Anxiety and depression  -     EScitalopram oxalate (LEXAPRO) 20 MG tablet; Take 1 tablet (20 mg total) by mouth once daily.  Dispense: 30 tablet; Refill: 2    3. Vitamin D deficiency  -     Vitamin D    4. Mixed hyperlipidemia  -     Lipid Panel    5. Seasonal allergies  -     azelastine (ASTEPRO ALLERGY) 205.5 mcg (0.15 %) Spry; 1 spray by Each Nostril route Daily.  Dispense: 4 mL; Refill: 0    6. ADHD (attention deficit hyperactivity disorder), inattentive type  -     atomoxetine (STRATTERA) 18 MG capsule; Take 1 capsule (18 mg total) by mouth once daily.  Dispense: 30 capsule; Refill: 0          No results found for this or any previous visit (from the past 24 hour(s)).    Follow Up:  Follow up in about 1 month (around 4/21/2024).      Discussed the diagnosis, prognosis, plan of care, chronic nature of and indications for, risks and benefits of treatment for conditions.  Continue all medications as prescribed unless otherwise noted.   Call if patient develops other symptoms or if not better in 2-3 days and sooner if worse. Emphasized the importance of compliance with all  recommendations, including medication use and timely follow up. Instructed to return as noted be or sooner if patient develops any other problems or if there are any other additional questions or concerns.

## 2024-03-20 NOTE — TELEPHONE ENCOUNTER
----- Message from Kaylene Villatoro sent at 3/20/2024  1:39 PM CDT -----  Regarding: Resend med  [facility] is out of [facility] 12.5.  Can you send in a request for the 10 since they do have that dose

## 2024-03-21 ENCOUNTER — OFFICE VISIT (OUTPATIENT)
Dept: FAMILY MEDICINE | Facility: CLINIC | Age: 39
End: 2024-03-21
Payer: COMMERCIAL

## 2024-03-21 VITALS
BODY MASS INDEX: 25.38 KG/M2 | SYSTOLIC BLOOD PRESSURE: 116 MMHG | TEMPERATURE: 97 F | OXYGEN SATURATION: 96 % | HEART RATE: 82 BPM | WEIGHT: 148.63 LBS | HEIGHT: 64 IN | RESPIRATION RATE: 18 BRPM | DIASTOLIC BLOOD PRESSURE: 68 MMHG

## 2024-03-21 DIAGNOSIS — E78.2 MIXED HYPERLIPIDEMIA: ICD-10-CM

## 2024-03-21 DIAGNOSIS — E55.9 VITAMIN D DEFICIENCY: ICD-10-CM

## 2024-03-21 DIAGNOSIS — J30.2 SEASONAL ALLERGIES: ICD-10-CM

## 2024-03-21 DIAGNOSIS — F32.A ANXIETY AND DEPRESSION: ICD-10-CM

## 2024-03-21 DIAGNOSIS — F90.0 ADHD (ATTENTION DEFICIT HYPERACTIVITY DISORDER), INATTENTIVE TYPE: ICD-10-CM

## 2024-03-21 DIAGNOSIS — F41.9 ANXIETY AND DEPRESSION: ICD-10-CM

## 2024-03-21 DIAGNOSIS — E11.9 TYPE 2 DIABETES MELLITUS WITHOUT COMPLICATION, WITHOUT LONG-TERM CURRENT USE OF INSULIN: Primary | ICD-10-CM

## 2024-03-21 PROBLEM — E66.01 SEVERE OBESITY (BMI 35.0-39.9) WITH COMORBIDITY: Status: RESOLVED | Noted: 2023-02-09 | Resolved: 2024-03-21

## 2024-03-21 PROCEDURE — 3066F NEPHROPATHY DOC TX: CPT | Mod: CPTII,,, | Performed by: NURSE PRACTITIONER

## 2024-03-21 PROCEDURE — 3061F NEG MICROALBUMINURIA REV: CPT | Mod: CPTII,,, | Performed by: NURSE PRACTITIONER

## 2024-03-21 PROCEDURE — 3008F BODY MASS INDEX DOCD: CPT | Mod: CPTII,,, | Performed by: NURSE PRACTITIONER

## 2024-03-21 PROCEDURE — 3044F HG A1C LEVEL LT 7.0%: CPT | Mod: CPTII,,, | Performed by: NURSE PRACTITIONER

## 2024-03-21 PROCEDURE — 3078F DIAST BP <80 MM HG: CPT | Mod: CPTII,,, | Performed by: NURSE PRACTITIONER

## 2024-03-21 PROCEDURE — 1159F MED LIST DOCD IN RCRD: CPT | Mod: CPTII,,, | Performed by: NURSE PRACTITIONER

## 2024-03-21 PROCEDURE — 3074F SYST BP LT 130 MM HG: CPT | Mod: CPTII,,, | Performed by: NURSE PRACTITIONER

## 2024-03-21 PROCEDURE — 99214 OFFICE O/P EST MOD 30 MIN: CPT | Mod: ,,, | Performed by: NURSE PRACTITIONER

## 2024-03-21 PROCEDURE — 2023F DILAT RTA XM W/O RTNOPTHY: CPT | Mod: CPTII,,, | Performed by: NURSE PRACTITIONER

## 2024-03-21 RX ORDER — ESCITALOPRAM OXALATE 20 MG/1
20 TABLET ORAL DAILY
Qty: 30 TABLET | Refills: 2 | Status: SHIPPED | OUTPATIENT
Start: 2024-03-21

## 2024-03-21 RX ORDER — ATOMOXETINE 18 MG/1
18 CAPSULE ORAL DAILY
Qty: 30 CAPSULE | Refills: 0 | Status: SHIPPED | OUTPATIENT
Start: 2024-03-21 | End: 2024-04-16

## 2024-03-21 RX ORDER — AZELASTINE HCL 205.5 UG/1
1 SPRAY NASAL DAILY
Qty: 4 ML | Refills: 0 | COMMUNITY
Start: 2024-03-21 | End: 2025-03-21

## 2024-03-22 LAB
25(OH)D3+25(OH)D2 SERPL-MCNC: 31.1 NG/ML (ref 30–100)
ALBUMIN SERPL-MCNC: 4.5 G/DL (ref 3.9–4.9)
ALBUMIN/GLOB SERPL: 1.8 {RATIO} (ref 1.2–2.2)
ALP SERPL-CCNC: 68 IU/L (ref 44–121)
ALT SERPL-CCNC: 39 IU/L (ref 0–32)
AST SERPL-CCNC: 26 IU/L (ref 0–40)
BASOPHILS # BLD AUTO: 0.1 X10E3/UL (ref 0–0.2)
BASOPHILS NFR BLD AUTO: 1 %
BILIRUB SERPL-MCNC: 0.7 MG/DL (ref 0–1.2)
BUN SERPL-MCNC: 12 MG/DL (ref 6–20)
BUN/CREAT SERPL: 14 (ref 9–23)
CALCIUM SERPL-MCNC: 9.7 MG/DL (ref 8.7–10.2)
CHLORIDE SERPL-SCNC: 101 MMOL/L (ref 96–106)
CHOLEST SERPL-MCNC: 181 MG/DL (ref 100–199)
CO2 SERPL-SCNC: 24 MMOL/L (ref 20–29)
CREAT SERPL-MCNC: 0.87 MG/DL (ref 0.57–1)
EOSINOPHIL # BLD AUTO: 0.1 X10E3/UL (ref 0–0.4)
EOSINOPHIL NFR BLD AUTO: 1 %
ERYTHROCYTE [DISTWIDTH] IN BLOOD BY AUTOMATED COUNT: 11.9 % (ref 11.7–15.4)
EST. GFR  (NO RACE VARIABLE): 88 ML/MIN/1.73
GLOBULIN SER CALC-MCNC: 2.5 G/DL (ref 1.5–4.5)
GLUCOSE SERPL-MCNC: 80 MG/DL (ref 70–99)
HBA1C MFR BLD: 5.2 % (ref 4.8–5.6)
HCT VFR BLD AUTO: 41.1 % (ref 34–46.6)
HDLC SERPL-MCNC: 64 MG/DL
HGB BLD-MCNC: 14.4 G/DL (ref 11.1–15.9)
IMM GRANULOCYTES NFR BLD AUTO: 1 %
LDLC SERPL CALC-MCNC: 104 MG/DL (ref 0–99)
LYMPHOCYTES # BLD AUTO: 2.4 X10E3/UL (ref 0.7–3.1)
LYMPHOCYTES NFR BLD AUTO: 42 %
MCH RBC QN AUTO: 30.4 PG (ref 26.6–33)
MCHC RBC AUTO-ENTMCNC: 35 G/DL (ref 31.5–35.7)
MCV RBC AUTO: 87 FL (ref 79–97)
MONOCYTES # BLD AUTO: 0.4 X10E3/UL (ref 0.1–0.9)
MONOCYTES NFR BLD AUTO: 7 %
NEUTROPHILS # BLD AUTO: 2.8 X10E3/UL (ref 1.4–7)
NEUTROPHILS NFR BLD AUTO: 48 %
PLATELET # BLD AUTO: 257 X10E3/UL (ref 150–450)
POTASSIUM SERPL-SCNC: 4.1 MMOL/L (ref 3.5–5.2)
PROT SERPL-MCNC: 7 G/DL (ref 6–8.5)
RBC # BLD AUTO: 4.73 X10E6/UL (ref 3.77–5.28)
SODIUM SERPL-SCNC: 140 MMOL/L (ref 134–144)
TRIGL SERPL-MCNC: 70 MG/DL (ref 0–149)
VLDLC SERPL CALC-MCNC: 13 MG/DL (ref 5–40)
WBC # BLD AUTO: 5.7 X10E3/UL (ref 3.4–10.8)

## 2024-03-27 DIAGNOSIS — E11.9 TYPE 2 DIABETES MELLITUS WITHOUT COMPLICATION, WITHOUT LONG-TERM CURRENT USE OF INSULIN: Primary | ICD-10-CM

## 2024-03-27 RX ORDER — METFORMIN HYDROCHLORIDE 500 MG/1
500 TABLET, EXTENDED RELEASE ORAL 2 TIMES DAILY WITH MEALS
Qty: 180 TABLET | Refills: 0 | Status: SHIPPED | OUTPATIENT
Start: 2024-03-27

## 2024-03-28 ENCOUNTER — TELEPHONE (OUTPATIENT)
Dept: FAMILY MEDICINE | Facility: CLINIC | Age: 39
End: 2024-03-28
Payer: COMMERCIAL

## 2024-03-28 NOTE — TELEPHONE ENCOUNTER
----- Message from Farideh Villatoro NP sent at 3/28/2024 12:27 PM CDT -----  Please notify patient of results.  Everything looks good.  No changes to medication.

## 2024-04-16 ENCOUNTER — TELEPHONE (OUTPATIENT)
Dept: FAMILY MEDICINE | Facility: CLINIC | Age: 39
End: 2024-04-16
Payer: COMMERCIAL

## 2024-04-16 DIAGNOSIS — F90.0 ADHD (ATTENTION DEFICIT HYPERACTIVITY DISORDER), INATTENTIVE TYPE: Primary | ICD-10-CM

## 2024-04-16 RX ORDER — ATOMOXETINE 25 MG/1
25 CAPSULE ORAL DAILY
Qty: 30 CAPSULE | Refills: 0 | Status: SHIPPED | OUTPATIENT
Start: 2024-04-16 | End: 2024-05-14 | Stop reason: DRUGHIGH

## 2024-04-16 NOTE — TELEPHONE ENCOUNTER
----- Message from Stacy Wick MA sent at 4/16/2024  2:53 PM CDT -----  Pt said mrs coleman told her when it was time for a refill if she felt she could go up they could       Statera 18 is what she is on right now but she would like to increase       [facility]

## 2024-04-16 NOTE — TELEPHONE ENCOUNTER
----- Message from Farideh Villatoro NP sent at 4/16/2024  4:17 PM CDT -----  You can send in Straterra 25 mg.  ----- Message -----  From: Lenora Arzola LPN  Sent: 4/16/2024   3:10 PM CDT  To: Farideh Villatoro NP    It looks like you wanted her in for a 1 month follow up which would be 4/21/24, but she is not scheduled until June. Do you want her in for a follow up now, or send in increased Strattera?  ----- Message -----  From: Stacy Wick MA  Sent: 4/16/2024   2:54 PM CDT  To: Irving Coleman Staff    Pt said mrs coleman told her when it was time for a refill if she felt she could go up they could       Statera 18 is what she is on right now but she would like to increase       [facility]

## 2024-04-16 NOTE — TELEPHONE ENCOUNTER
Patient was suppose to have a 1 month f/u which would have been around 4/21/24, but she is not scheduled until June. I am waiting on Farideh's clarification if she wants a f/u appointment or to increase medication without appointment.

## 2024-04-18 ENCOUNTER — TELEPHONE (OUTPATIENT)
Dept: FAMILY MEDICINE | Facility: CLINIC | Age: 39
End: 2024-04-18
Payer: COMMERCIAL

## 2024-04-18 DIAGNOSIS — E11.9 TYPE 2 DIABETES MELLITUS WITHOUT COMPLICATION, WITHOUT LONG-TERM CURRENT USE OF INSULIN: ICD-10-CM

## 2024-04-18 RX ORDER — TIRZEPATIDE 10 MG/.5ML
INJECTION, SOLUTION SUBCUTANEOUS
Qty: 4 PEN | Refills: 1 | Status: SHIPPED | OUTPATIENT
Start: 2024-04-18 | End: 2024-05-16 | Stop reason: SDUPTHER

## 2024-04-18 NOTE — TELEPHONE ENCOUNTER
----- Message from Magda Norman sent at 4/18/2024  1:36 PM CDT -----  Patient needs refill on Monjuro 10 called in to [facility]

## 2024-05-14 DIAGNOSIS — F90.0 ADHD (ATTENTION DEFICIT HYPERACTIVITY DISORDER), INATTENTIVE TYPE: Primary | ICD-10-CM

## 2024-05-14 RX ORDER — ATOMOXETINE 40 MG/1
40 CAPSULE ORAL DAILY
Qty: 30 CAPSULE | Refills: 0 | Status: SHIPPED | OUTPATIENT
Start: 2024-05-14 | End: 2024-06-11

## 2024-05-16 ENCOUNTER — TELEPHONE (OUTPATIENT)
Dept: FAMILY MEDICINE | Facility: CLINIC | Age: 39
End: 2024-05-16
Payer: COMMERCIAL

## 2024-05-16 DIAGNOSIS — E11.9 TYPE 2 DIABETES MELLITUS WITHOUT COMPLICATION, WITHOUT LONG-TERM CURRENT USE OF INSULIN: ICD-10-CM

## 2024-05-16 RX ORDER — TIRZEPATIDE 10 MG/.5ML
10 INJECTION, SOLUTION SUBCUTANEOUS
Qty: 4 PEN | Refills: 1 | Status: SHIPPED | OUTPATIENT
Start: 2024-05-16

## 2024-05-16 NOTE — TELEPHONE ENCOUNTER
----- Message from Stacy Lugo MA sent at 5/16/2024  3:44 PM CDT -----  Farideh Kiran    [facility]

## 2024-05-19 DIAGNOSIS — G47.00 INSOMNIA, UNSPECIFIED TYPE: ICD-10-CM

## 2024-05-20 RX ORDER — TRAZODONE HYDROCHLORIDE 50 MG/1
TABLET ORAL
Qty: 30 TABLET | Refills: 2 | Status: SHIPPED | OUTPATIENT
Start: 2024-05-20

## 2024-05-21 ENCOUNTER — TELEPHONE (OUTPATIENT)
Dept: FAMILY MEDICINE | Facility: CLINIC | Age: 39
End: 2024-05-21
Payer: COMMERCIAL

## 2024-05-21 DIAGNOSIS — E11.9 TYPE 2 DIABETES MELLITUS WITHOUT COMPLICATION, WITHOUT LONG-TERM CURRENT USE OF INSULIN: Primary | ICD-10-CM

## 2024-05-21 NOTE — TELEPHONE ENCOUNTER
----- Message from Magda Norman sent at 5/21/2024  2:31 PM CDT -----  Patient called stating [facility] only has Monjuro 7.5.  Can we call in the lower dose of 7.5?

## 2024-06-05 ENCOUNTER — OFFICE VISIT (OUTPATIENT)
Dept: URGENT CARE | Facility: CLINIC | Age: 39
End: 2024-06-05
Payer: COMMERCIAL

## 2024-06-05 VITALS
HEIGHT: 64 IN | RESPIRATION RATE: 20 BRPM | HEART RATE: 87 BPM | SYSTOLIC BLOOD PRESSURE: 126 MMHG | WEIGHT: 141 LBS | BODY MASS INDEX: 24.07 KG/M2 | OXYGEN SATURATION: 96 % | TEMPERATURE: 98 F | DIASTOLIC BLOOD PRESSURE: 76 MMHG

## 2024-06-05 DIAGNOSIS — J32.9 SINUSITIS, UNSPECIFIED CHRONICITY, UNSPECIFIED LOCATION: ICD-10-CM

## 2024-06-05 DIAGNOSIS — J02.9 SORE THROAT: Primary | ICD-10-CM

## 2024-06-05 LAB
CTP QC/QA: YES
MOLECULAR STREP A: NEGATIVE

## 2024-06-05 PROCEDURE — 87651 STREP A DNA AMP PROBE: CPT | Mod: QW,,,

## 2024-06-05 PROCEDURE — 99213 OFFICE O/P EST LOW 20 MIN: CPT | Mod: 25,,,

## 2024-06-05 PROCEDURE — 96372 THER/PROPH/DIAG INJ SC/IM: CPT | Mod: ,,,

## 2024-06-05 RX ORDER — POTASSIUM CITRATE 15 MEQ/1
1 TABLET, EXTENDED RELEASE ORAL 2 TIMES DAILY
COMMUNITY
Start: 2024-05-08

## 2024-06-05 RX ORDER — DEXAMETHASONE SODIUM PHOSPHATE 100 MG/10ML
6 INJECTION INTRAMUSCULAR; INTRAVENOUS
Status: COMPLETED | OUTPATIENT
Start: 2024-06-05 | End: 2024-06-05

## 2024-06-05 RX ORDER — DOXYCYCLINE 100 MG/1
100 CAPSULE ORAL EVERY 12 HOURS
Qty: 14 CAPSULE | Refills: 0 | Status: SHIPPED | OUTPATIENT
Start: 2024-06-05 | End: 2024-06-12

## 2024-06-05 RX ADMIN — DEXAMETHASONE SODIUM PHOSPHATE 6 MG: 100 INJECTION INTRAMUSCULAR; INTRAVENOUS at 02:06

## 2024-06-05 NOTE — PROGRESS NOTES
"Subjective:      Patient ID: Nuris Montgomery is a 38 y.o. female.    Vitals:  height is 5' 4" (1.626 m) and weight is 64 kg (141 lb). Her oral temperature is 98.2 °F (36.8 °C). Her blood pressure is 126/76 and her pulse is 87. Her respiration is 20 and oxygen saturation is 96%.     Chief Complaint: Sore Throat     Patient is a 38 y.o. female who presents to urgent care with complaints of sore throat, sinus congestion, chills x1 weeks. Alleviating factors include dimetapp with mild amount of relief. Patient denies fever, body aches, sob, cp, n/v/d, abdominal complaints, rash, difficulty swallowing, neck stiffness, or changes in intake or output.         Constitution: Positive for chills. Negative for fatigue and fever.   HENT:  Positive for congestion and sore throat. Negative for trouble swallowing and voice change.    Neck: neck negative.   Eyes: Negative.    Respiratory:  Negative for shortness of breath, wheezing and asthma.    Allergic/Immunologic: Negative for asthma.      Objective:     Physical Exam   Constitutional: She is oriented to person, place, and time. She appears well-developed. She is cooperative.  Non-toxic appearance. She does not appear ill. No distress.   HENT:   Head: Normocephalic and atraumatic.   Ears:   Right Ear: Hearing, tympanic membrane and external ear normal.   Left Ear: Hearing, tympanic membrane and external ear normal.   Nose: Congestion present.   Mouth/Throat: Mucous membranes are normal. Mucous membranes are moist. Posterior oropharyngeal erythema (clear pnd) present. Oropharynx is clear.   Eyes: Conjunctivae and lids are normal.   Neck: Trachea normal and phonation normal. Neck supple. No edema present. No erythema present. No neck rigidity present.   Cardiovascular: Normal rate.   Pulmonary/Chest: Effort normal and breath sounds normal. No stridor. No respiratory distress. She has no decreased breath sounds. She has no wheezes. She has no rhonchi. She has no rales. " "  Abdominal: Normal appearance.   Neurological: She is alert and oriented to person, place, and time. She exhibits normal muscle tone.   Skin: Skin is warm, dry, intact, not diaphoretic and no rash. Capillary refill takes less than 2 seconds.   Psychiatric: Her speech is normal and behavior is normal. Mood normal.   Nursing note and vitals reviewed.       Previous History      Review of patient's allergies indicates:   Allergen Reactions    Adhesive     Percocet [oxycodone-acetaminophen] Hives    Sulfa (sulfonamide antibiotics)        Past Medical History:   Diagnosis Date    Adenomyosis     Anxiety disorder, unspecified     Chronic cholecystitis     Depression     Diabetes mellitus     Fatty liver     Fibroids, intramural     GERD (gastroesophageal reflux disease)     Heavy menstrual bleeding     History of kidney stones     HLD (hyperlipidemia)     Irregular menstruation     PATRIZIA on CPAP     Pelvic pain     Respiratory distress     "oxygen drops c anesthesia"     Current Outpatient Medications   Medication Instructions    ALPRAZolam (XANAX) 0.25 MG tablet TAKE 1 TABLET(0.25 MG) BY MOUTH THREE TIMES DAILY AS NEEDED FOR ANXIETY    atomoxetine (STRATTERA) 40 mg, Oral, Daily    azelastine (ASTEPRO ALLERGY) 205.5 mcg (0.15 %) Spry 1 spray, Each Nostril, Daily    doxycycline (MONODOX) 100 mg, Oral, Every 12 hours    EScitalopram oxalate (LEXAPRO) 20 mg, Oral, Daily    metFORMIN (GLUCOPHAGE-XR) 500 mg, Oral, 2 times daily with meals    MOUNJARO 10 mg, Subcutaneous, Every 7 days    omeprazole (PRILOSEC) 40 mg, Oral, Daily    potassium citrate (UROCIT-K 15) 15 mEq TbSR 1 tablet, Oral, 2 times daily    tirzepatide 7.5 mg, Subcutaneous, Every 7 days    traZODone (DESYREL) 50 MG tablet TAKE 1 TABLET BY MOUTH EVERY NIGHT AT BEDTIME WITH FOOD FOR SLEEP AND OR DEPRESSION     Past Surgical History:   Procedure Laterality Date    DENTAL SURGERY      LAPAROSCOPIC CHOLECYSTECTOMY N/A 10/14/2022    Procedure: CHOLECYSTECTOMY, " "LAPAROSCOPIC;  Surgeon: Dalton Boateng MD;  Location: [facility] OR;  Service: General;  Laterality: N/A;    LITHOTRIPSY      REPLACEMENT OF NEPHROSTOMY TUBE      ROBOT-ASSISTED LAPAROSCOPIC ABDOMINAL HYSTERECTOMY USING DA AMINAH XI N/A 9/20/2023    Procedure: XI ROBOTIC HYSTERECTOMY;  Surgeon: Wilbur Will MD;  Location: [facility] OR;  Service: OB/GYN;  Laterality: N/A;  RALH / POSS BSO    URETERAL STENT PLACEMENT       Family History   Family history unknown: Yes       Social History     Tobacco Use    Smoking status: Never     Passive exposure: Never    Smokeless tobacco: Never   Substance Use Topics    Alcohol use: Not Currently    Drug use: Never        Physical Exam      Vital Signs Reviewed   /76 (BP Location: Right arm)   Pulse 87   Temp 98.2 °F (36.8 °C) (Oral)   Resp 20   Ht 5' 4" (1.626 m)   Wt 64 kg (141 lb)   LMP 09/11/2023 (Exact Date)   SpO2 96%   BMI 24.20 kg/m²        Procedures    Procedures     Labs     Results for orders placed or performed in visit on 06/05/24   POCT Strep A, Molecular   Result Value Ref Range    Molecular Strep A, POC Negative Negative     Acceptable Yes          Assessment:     1. Sore throat    2. Sinusitis, unspecified chronicity, unspecified location        Plan:       Sore throat  -     POCT Strep A, Molecular    Sinusitis, unspecified chronicity, unspecified location    Other orders  -     doxycycline (MONODOX) 100 MG capsule; Take 1 capsule (100 mg total) by mouth every 12 (twelve) hours. for 7 days  Dispense: 14 capsule; Refill: 0  -     dexAMETHasone injection 6 mg    Strep negative    If symptoms worsen over the next few days, start the antibiotic, if you do start it take to completion, take with food.   Start taking an allergy pill daily such as claritin, zyrtec, allegrea or xyzal. Also start using a nasal steroid spray such as flonase or nasacort daily. If you are not being treated for high blood pressure, you can also take decongestant " such as sudafed as needed. They can be purchased over the counter. Monitor for fever. Take tylenol/acetaminophen or ibuprofen as needed. Rest and hydrate. If symptoms persist or worsen, return to clinic or seek medical attention immediately.

## 2024-06-05 NOTE — PATIENT INSTRUCTIONS
Strep negative    If symptoms worsen over the next few days, start the antibiotic, if you do start it take to completion, take with food.   Start taking an allergy pill daily such as claritin, zyrtec, allegrea or xyzal. Also start using a nasal steroid spray such as flonase or nasacort daily. If you are not being treated for high blood pressure, you can also take decongestant such as sudafed as needed. They can be purchased over the counter. Monitor for fever. Take tylenol/acetaminophen or ibuprofen as needed. Rest and hydrate. If symptoms persist or worsen, return to clinic or seek medical attention immediately.

## 2024-06-11 DIAGNOSIS — F90.0 ADHD (ATTENTION DEFICIT HYPERACTIVITY DISORDER), INATTENTIVE TYPE: ICD-10-CM

## 2024-06-11 RX ORDER — ATOMOXETINE 40 MG/1
CAPSULE ORAL
Qty: 30 CAPSULE | Refills: 0 | Status: SHIPPED | OUTPATIENT
Start: 2024-06-11

## 2024-06-22 DIAGNOSIS — F32.A ANXIETY AND DEPRESSION: ICD-10-CM

## 2024-06-22 DIAGNOSIS — F41.9 ANXIETY AND DEPRESSION: ICD-10-CM

## 2024-06-24 RX ORDER — ESCITALOPRAM OXALATE 20 MG/1
TABLET ORAL
Qty: 30 TABLET | Refills: 2 | Status: SHIPPED | OUTPATIENT
Start: 2024-06-24

## 2024-07-01 ENCOUNTER — TELEPHONE (OUTPATIENT)
Dept: FAMILY MEDICINE | Facility: CLINIC | Age: 39
End: 2024-07-01
Payer: COMMERCIAL

## 2024-07-01 DIAGNOSIS — E11.9 TYPE 2 DIABETES MELLITUS WITHOUT COMPLICATION, WITHOUT LONG-TERM CURRENT USE OF INSULIN: ICD-10-CM

## 2024-07-01 RX ORDER — METFORMIN HYDROCHLORIDE 500 MG/1
500 TABLET, EXTENDED RELEASE ORAL 2 TIMES DAILY WITH MEALS
Qty: 180 TABLET | Refills: 0 | Status: SHIPPED | OUTPATIENT
Start: 2024-07-01

## 2024-07-09 DIAGNOSIS — F90.0 ADHD (ATTENTION DEFICIT HYPERACTIVITY DISORDER), INATTENTIVE TYPE: ICD-10-CM

## 2024-07-09 RX ORDER — ATOMOXETINE 40 MG/1
CAPSULE ORAL
Qty: 30 CAPSULE | Refills: 0 | Status: SHIPPED | OUTPATIENT
Start: 2024-07-09

## 2024-07-18 DIAGNOSIS — E11.9 TYPE 2 DIABETES MELLITUS WITHOUT COMPLICATION, WITHOUT LONG-TERM CURRENT USE OF INSULIN: Primary | ICD-10-CM

## 2024-07-18 RX ORDER — TIRZEPATIDE 12.5 MG/.5ML
INJECTION, SOLUTION SUBCUTANEOUS
Qty: 2 ML | Refills: 2 | Status: SHIPPED | OUTPATIENT
Start: 2024-07-18

## 2024-07-30 NOTE — PROGRESS NOTES
"SUBJECTIVE:  Nuris Montgomery is a 39 y.o. female here accompanied by mother and child for medication refills.    HPI  Patient presents for medication refills. Strattera not working as well as it was. Would like to increase it. Taking 2 trazodone at night to sleep equaling to 100 mg. Need new script for 100 mg. Recently switched insurance and will need PA on Mounjaro to continue. Was on 15 mg weekly, but down to 12.5 mg weekly due to 15 mg shortage.  Fasting sugar this AM off GLP-1 was 156.  Xanax taken three times daily as needed at the moment. Many life stressors. Patient feels like sometimes it is not enough for her anxiety. Nuris's allergies, medications, history, and problem list were updated as appropriate.    Review of Systems   A comprehensive review of symptoms was completed and negative except as noted above.    OBJECTIVE:  Vital signs  Vitals:    07/31/24 1406   BP: 124/72   BP Location: Left arm   Patient Position: Sitting   Pulse: 82   Resp: 18   Temp: 97.5 °F (36.4 °C)   TempSrc: Temporal   SpO2: 100%   Weight: 63.5 kg (140 lb)   Height: 5' 4" (1.626 m)        Physical Exam  Constitutional:       Appearance: Normal appearance.   HENT:      Head: Normocephalic and atraumatic.      Nose: Nose normal.      Mouth/Throat:      Mouth: Mucous membranes are moist.      Pharynx: Oropharynx is clear.   Eyes:      Conjunctiva/sclera: Conjunctivae normal.   Cardiovascular:      Rate and Rhythm: Normal rate and regular rhythm.   Pulmonary:      Effort: Pulmonary effort is normal.      Breath sounds: Normal breath sounds.   Abdominal:      General: Bowel sounds are normal.      Palpations: Abdomen is soft.   Musculoskeletal:         General: Normal range of motion.      Cervical back: Normal range of motion and neck supple.   Skin:     General: Skin is warm.      Capillary Refill: Capillary refill takes less than 2 seconds.   Neurological:      Mental Status: She is alert.   Psychiatric:         Mood and Affect: " Mood is anxious. Affect is tearful.         Behavior: Behavior normal.         Judgment: Judgment normal.          No visits with results within 1 Day(s) from this visit.   Latest known visit with results is:   Office Visit on 06/05/2024   Component Date Value Ref Range Status    Molecular Strep A, POC 06/05/2024 Negative  Negative Final     Acceptable 06/05/2024 Yes   Final          ASSESSMENT/PLAN:    1. Type 2 diabetes mellitus without complication, without long-term current use of insulin  Comments:  now on medicaid, will likely need new PA, blood sugars hive increased since off mounjouro  Orders:  -     Discontinue: tirzepatide (MOUNJARO) 12.5 mg/0.5 mL PnIj; Inject 12.5 mg into the skin every 7 days.  Dispense: 4 Pen; Refill: 0  -     tirzepatide (MOUNJARO) 12.5 mg/0.5 mL PnIj; Inject 12.5 mg into the skin every 7 days.  Dispense: 4 Pen; Refill: 0    2. Anxiety and depression  Comments:  multiple life stressors, lost insurance and now on medicaid  Orders:  -     ALPRAZolam (XANAX) 0.5 MG tablet; Take 1 tablet (0.5 mg total) by mouth 3 (three) times daily.  Dispense: 90 tablet; Refill: 2    3. ADHD (attention deficit hyperactivity disorder), inattentive type  Comments:  straterra not working well, will increase dose  Orders:  -     atomoxetine (STRATTERA) 60 MG capsule; Take 1 capsule (60 mg total) by mouth once daily.  Dispense: 30 capsule; Refill: 0    4. Insomnia, unspecified type  Comments:  has been taking 2 of the 50 mg to sleep - will increase dose to 100 mg  Orders:  -     traZODone (DESYREL) 100 MG tablet; Take 1 tablet (100 mg total) by mouth every evening.  Dispense: 30 tablet; Refill: 2          No results found for this or any previous visit (from the past 24 hour(s)).    Follow Up:  Follow up in about 1 month (around 8/31/2024).      Discussed the diagnosis, prognosis, plan of care, chronic nature of and indications for, risks and benefits of treatment for conditions.  Continue all  medications as prescribed unless otherwise noted.   Call if patient develops other symptoms or if not better in 2-3 days and sooner if worse. Emphasized the importance of compliance with all recommendations, including medication use and timely follow up. Instructed to return as noted be or sooner if patient develops any other problems or if there are any other additional questions or concerns.

## 2024-07-31 ENCOUNTER — OFFICE VISIT (OUTPATIENT)
Dept: FAMILY MEDICINE | Facility: CLINIC | Age: 39
End: 2024-07-31
Payer: MEDICAID

## 2024-07-31 VITALS
HEIGHT: 64 IN | BODY MASS INDEX: 23.9 KG/M2 | OXYGEN SATURATION: 100 % | DIASTOLIC BLOOD PRESSURE: 72 MMHG | SYSTOLIC BLOOD PRESSURE: 124 MMHG | TEMPERATURE: 98 F | RESPIRATION RATE: 18 BRPM | HEART RATE: 82 BPM | WEIGHT: 140 LBS

## 2024-07-31 DIAGNOSIS — F41.9 ANXIETY AND DEPRESSION: ICD-10-CM

## 2024-07-31 DIAGNOSIS — E11.9 TYPE 2 DIABETES MELLITUS WITHOUT COMPLICATION, WITHOUT LONG-TERM CURRENT USE OF INSULIN: Primary | ICD-10-CM

## 2024-07-31 DIAGNOSIS — G47.00 INSOMNIA, UNSPECIFIED TYPE: ICD-10-CM

## 2024-07-31 DIAGNOSIS — F90.0 ADHD (ATTENTION DEFICIT HYPERACTIVITY DISORDER), INATTENTIVE TYPE: ICD-10-CM

## 2024-07-31 DIAGNOSIS — F32.A ANXIETY AND DEPRESSION: ICD-10-CM

## 2024-07-31 PROCEDURE — 3061F NEG MICROALBUMINURIA REV: CPT | Mod: CPTII,,, | Performed by: NURSE PRACTITIONER

## 2024-07-31 PROCEDURE — 3044F HG A1C LEVEL LT 7.0%: CPT | Mod: CPTII,,, | Performed by: NURSE PRACTITIONER

## 2024-07-31 PROCEDURE — 3008F BODY MASS INDEX DOCD: CPT | Mod: CPTII,,, | Performed by: NURSE PRACTITIONER

## 2024-07-31 PROCEDURE — 99214 OFFICE O/P EST MOD 30 MIN: CPT | Mod: ,,, | Performed by: NURSE PRACTITIONER

## 2024-07-31 PROCEDURE — 3066F NEPHROPATHY DOC TX: CPT | Mod: CPTII,,, | Performed by: NURSE PRACTITIONER

## 2024-07-31 PROCEDURE — 3078F DIAST BP <80 MM HG: CPT | Mod: CPTII,,, | Performed by: NURSE PRACTITIONER

## 2024-07-31 PROCEDURE — 1159F MED LIST DOCD IN RCRD: CPT | Mod: CPTII,,, | Performed by: NURSE PRACTITIONER

## 2024-07-31 PROCEDURE — 3074F SYST BP LT 130 MM HG: CPT | Mod: CPTII,,, | Performed by: NURSE PRACTITIONER

## 2024-07-31 PROCEDURE — 2023F DILAT RTA XM W/O RTNOPTHY: CPT | Mod: CPTII,,, | Performed by: NURSE PRACTITIONER

## 2024-07-31 RX ORDER — TRAZODONE HYDROCHLORIDE 100 MG/1
100 TABLET ORAL NIGHTLY
Qty: 30 TABLET | Refills: 2 | Status: SHIPPED | OUTPATIENT
Start: 2024-07-31

## 2024-07-31 RX ORDER — TIRZEPATIDE 12.5 MG/.5ML
12.5 INJECTION, SOLUTION SUBCUTANEOUS
Qty: 4 PEN | Refills: 0 | Status: SHIPPED | OUTPATIENT
Start: 2024-07-31

## 2024-07-31 RX ORDER — ATOMOXETINE 60 MG/1
60 CAPSULE ORAL DAILY
Qty: 30 CAPSULE | Refills: 0 | Status: SHIPPED | OUTPATIENT
Start: 2024-07-31 | End: 2024-08-30

## 2024-07-31 RX ORDER — TIRZEPATIDE 12.5 MG/.5ML
12.5 INJECTION, SOLUTION SUBCUTANEOUS
Qty: 4 PEN | Refills: 0 | Status: SHIPPED | OUTPATIENT
Start: 2024-07-31 | End: 2024-07-31

## 2024-07-31 RX ORDER — ALPRAZOLAM 0.5 MG/1
0.5 TABLET ORAL 3 TIMES DAILY
Qty: 90 TABLET | Refills: 2 | Status: SHIPPED | OUTPATIENT
Start: 2024-07-31

## (undated) DEVICE — APPLICATOR SURGICEL ENDOSCOPIC

## (undated) DEVICE — GLOVE PROTEXIS HYDROGEL SZ6.5

## (undated) DEVICE — GLOVE PROTEXIS HYDROGEL SZ7

## (undated) DEVICE — DRAPE ARM DAVINCI XI

## (undated) DEVICE — TIP RUMI KOH-EFFICIENT 3.5

## (undated) DEVICE — PORT ACCESS 8MM W/120MM LOW

## (undated) DEVICE — SOL NORMAL USPCA 0.9%

## (undated) DEVICE — SUT PROLENE 0 CT-2 BL MONO

## (undated) DEVICE — SET TRI-LUMEN FILTERED TUBE

## (undated) DEVICE — SOL CLEARIFY VISUALIZATION LAP

## (undated) DEVICE — COVER TABLE HVY DTY 60X90IN

## (undated) DEVICE — KIT SURGICAL TURNOVER

## (undated) DEVICE — PAD SANITARY HVY ABSRB UNSCNTD

## (undated) DEVICE — LEGGING SURG CONV 43X28IN

## (undated) DEVICE — CLOSURE SKIN STERI STRIP 1/2X4

## (undated) DEVICE — SPONGE LAP STRL 18X18IN

## (undated) DEVICE — TRAY CATH FOL SIL URIMTR 16FR

## (undated) DEVICE — TRAY SKIN SCRUB WET PREMIUM

## (undated) DEVICE — OBTURATOR BLADELESS 8MM XI CLR

## (undated) DEVICE — SEALER VESSEL EXTEND

## (undated) DEVICE — COVER TIP CURVED SCISSORS XI

## (undated) DEVICE — IRRIGATOR SUCTION W/TIP

## (undated) DEVICE — GLOVE PROTEXIS HYDROGEL SZ7.5

## (undated) DEVICE — HEMOSTAT SURGICEL PWD 3G

## (undated) DEVICE — COVER MAYO STAND REINFRCD 30

## (undated) DEVICE — SYR DISP LL 5CC

## (undated) DEVICE — GOWN POLY REINF X-LONG 2XL

## (undated) DEVICE — CARTRIDGE BABCOCK GRASPER 5X45

## (undated) DEVICE — PACK GYN LAPAROSCOPY HZD

## (undated) DEVICE — SUT ABS CLIP LAPRA-TY CTD

## (undated) DEVICE — DRAPE COLUMN DAVINCI XI

## (undated) DEVICE — TIP RUMI BLUE DISPOSABLE 5/BX

## (undated) DEVICE — GLOVE PROTEXIS PI SYN SURG 8.0

## (undated) DEVICE — PAD PINK TRENDELENBURG POS XL

## (undated) DEVICE — ELECTRODE PATIENT RETURN DISP

## (undated) DEVICE — SEAL UNIVERSAL 5MM-8MM XI

## (undated) DEVICE — APPLICATOR CHLORAPREP ORN 26ML

## (undated) DEVICE — SUT MCRYL PLUS 4-0 PS2 27IN

## (undated) DEVICE — Device